# Patient Record
Sex: FEMALE | Race: WHITE | NOT HISPANIC OR LATINO | Employment: OTHER | URBAN - METROPOLITAN AREA
[De-identification: names, ages, dates, MRNs, and addresses within clinical notes are randomized per-mention and may not be internally consistent; named-entity substitution may affect disease eponyms.]

---

## 2018-07-13 ENCOUNTER — HOSPITAL ENCOUNTER (INPATIENT)
Facility: HOSPITAL | Age: 73
LOS: 2 days | Discharge: HOME | End: 2018-07-15
Attending: EMERGENCY MEDICINE | Admitting: INTERNAL MEDICINE

## 2018-07-13 ENCOUNTER — APPOINTMENT (OUTPATIENT)
Dept: GENERAL RADIOLOGY | Facility: HOSPITAL | Age: 73
End: 2018-07-13

## 2018-07-13 ENCOUNTER — APPOINTMENT (OUTPATIENT)
Dept: CARDIOLOGY | Facility: HOSPITAL | Age: 73
End: 2018-07-13
Attending: INTERNAL MEDICINE

## 2018-07-13 DIAGNOSIS — R11.10 VOMITING AND DIARRHEA: ICD-10-CM

## 2018-07-13 DIAGNOSIS — I44.2 THIRD DEGREE HEART BLOCK (HCC): Primary | ICD-10-CM

## 2018-07-13 DIAGNOSIS — R19.7 VOMITING AND DIARRHEA: ICD-10-CM

## 2018-07-13 PROBLEM — E03.9 HYPOTHYROID: Status: ACTIVE | Noted: 2018-07-13

## 2018-07-13 PROBLEM — I10 HTN (HYPERTENSION): Status: ACTIVE | Noted: 2018-07-13

## 2018-07-13 PROBLEM — J44.9 COPD (CHRONIC OBSTRUCTIVE PULMONARY DISEASE) (HCC): Status: ACTIVE | Noted: 2018-07-13

## 2018-07-13 PROBLEM — M06.9 RHEUMATOID ARTHRITIS (HCC): Status: ACTIVE | Noted: 2018-07-13

## 2018-07-13 LAB
ALBUMIN SERPL-MCNC: 4.2 G/DL (ref 3.5–5.2)
ALBUMIN/GLOB SERPL: 1.6 G/DL
ALP SERPL-CCNC: 71 U/L (ref 39–117)
ALT SERPL W P-5'-P-CCNC: 23 U/L (ref 1–33)
ANION GAP SERPL CALCULATED.3IONS-SCNC: 16.5 MMOL/L
APTT PPP: 28 SECONDS (ref 22.7–35.4)
ARTERIAL PATENCY WRIST A: POSITIVE
AST SERPL-CCNC: 19 U/L (ref 1–32)
ATMOSPHERIC PRESS: 755.9 MMHG
BASE EXCESS BLDA CALC-SCNC: -6 MMOL/L (ref 0–2)
BASOPHILS # BLD AUTO: 0.02 10*3/MM3 (ref 0–0.2)
BASOPHILS NFR BLD AUTO: 0.4 % (ref 0–1.5)
BDY SITE: ABNORMAL
BH CV ECHO MEAS - ACS: 2.2 CM
BH CV ECHO MEAS - AO MEAN PG (FULL): 3 MMHG
BH CV ECHO MEAS - AO MEAN PG: 5 MMHG
BH CV ECHO MEAS - AO V2 MAX: 152 CM/SEC
BH CV ECHO MEAS - AO V2 MEAN: 105 CM/SEC
BH CV ECHO MEAS - AO V2 VTI: 27.4 CM
BH CV ECHO MEAS - AVA(I,A): 2.5 CM^2
BH CV ECHO MEAS - AVA(I,D): 2.5 CM^2
BH CV ECHO MEAS - BSA(HAYCOCK): 1.9 M^2
BH CV ECHO MEAS - BSA: 1.8 M^2
BH CV ECHO MEAS - BZI_BMI: 32.3 KILOGRAMS/M^2
BH CV ECHO MEAS - BZI_METRIC_HEIGHT: 154.9 CM
BH CV ECHO MEAS - BZI_METRIC_WEIGHT: 77.6 KG
BH CV ECHO MEAS - CONTRAST EF (2CH): 55.1 ML/M^2
BH CV ECHO MEAS - CONTRAST EF 4CH: 53.5 ML/M^2
BH CV ECHO MEAS - EDV(CUBED): 91.1 ML
BH CV ECHO MEAS - EDV(MOD-SP2): 89 ML
BH CV ECHO MEAS - EDV(MOD-SP4): 99 ML
BH CV ECHO MEAS - EDV(TEICH): 92.4 ML
BH CV ECHO MEAS - EF(CUBED): 78.4 %
BH CV ECHO MEAS - EF(MOD-BP): 54 %
BH CV ECHO MEAS - EF(MOD-SP2): 55.1 %
BH CV ECHO MEAS - EF(MOD-SP4): 53.5 %
BH CV ECHO MEAS - EF(TEICH): 70.8 %
BH CV ECHO MEAS - ESV(CUBED): 19.7 ML
BH CV ECHO MEAS - ESV(MOD-SP2): 40 ML
BH CV ECHO MEAS - ESV(MOD-SP4): 46 ML
BH CV ECHO MEAS - ESV(TEICH): 27 ML
BH CV ECHO MEAS - FS: 40 %
BH CV ECHO MEAS - IVS/LVPW: 1
BH CV ECHO MEAS - IVSD: 0.8 CM
BH CV ECHO MEAS - LA DIMENSION: 5 CM
BH CV ECHO MEAS - LAT PEAK E' VEL: 7 CM/SEC
BH CV ECHO MEAS - LV DIASTOLIC VOL/BSA (35-75): 56 ML/M^2
BH CV ECHO MEAS - LV MASS(C)D: 113.6 GRAMS
BH CV ECHO MEAS - LV MASS(C)DI: 64.3 GRAMS/M^2
BH CV ECHO MEAS - LV MEAN PG: 2 MMHG
BH CV ECHO MEAS - LV SYSTOLIC VOL/BSA (12-30): 26 ML/M^2
BH CV ECHO MEAS - LV V1 MAX: 93 CM/SEC
BH CV ECHO MEAS - LV V1 MEAN: 61.1 CM/SEC
BH CV ECHO MEAS - LV V1 VTI: 19.4 CM
BH CV ECHO MEAS - LVIDD: 4.5 CM
BH CV ECHO MEAS - LVIDS: 2.7 CM
BH CV ECHO MEAS - LVLD AP2: 7.8 CM
BH CV ECHO MEAS - LVLD AP4: 8.4 CM
BH CV ECHO MEAS - LVLS AP2: 6.6 CM
BH CV ECHO MEAS - LVLS AP4: 7.5 CM
BH CV ECHO MEAS - LVOT AREA (M): 3.5 CM^2
BH CV ECHO MEAS - LVOT AREA: 3.5 CM^2
BH CV ECHO MEAS - LVOT DIAM: 2.1 CM
BH CV ECHO MEAS - LVPWD: 0.8 CM
BH CV ECHO MEAS - MED PEAK E' VEL: 6 CM/SEC
BH CV ECHO MEAS - MV A DUR: 0.14 SEC
BH CV ECHO MEAS - MV A MAX VEL: 105 CM/SEC
BH CV ECHO MEAS - MV DEC SLOPE: 434 CM/SEC^2
BH CV ECHO MEAS - MV DEC TIME: 0.38 SEC
BH CV ECHO MEAS - MV E MAX VEL: 81.9 CM/SEC
BH CV ECHO MEAS - MV E/A: 0.78
BH CV ECHO MEAS - MV MEAN PG: 2 MMHG
BH CV ECHO MEAS - MV P1/2T MAX VEL: 96.4 CM/SEC
BH CV ECHO MEAS - MV P1/2T: 65 MSEC
BH CV ECHO MEAS - MV V2 MEAN: 72.8 CM/SEC
BH CV ECHO MEAS - MV V2 VTI: 26.9 CM
BH CV ECHO MEAS - MVA P1/2T LCG: 2.3 CM^2
BH CV ECHO MEAS - MVA(P1/2T): 3.4 CM^2
BH CV ECHO MEAS - MVA(VTI): 2.5 CM^2
BH CV ECHO MEAS - PA ACC SLOPE: 14.8 CM/SEC^2
BH CV ECHO MEAS - PA ACC TIME: 0.09 SEC
BH CV ECHO MEAS - PA MAX PG: 4.4 MMHG
BH CV ECHO MEAS - PA PR(ACCEL): 37.6 MMHG
BH CV ECHO MEAS - PA V2 MAX: 105 CM/SEC
BH CV ECHO MEAS - PULM A REVS DUR: 0.12 SEC
BH CV ECHO MEAS - PULM A REVS VEL: 30.1 CM/SEC
BH CV ECHO MEAS - PULM DIAS VEL: 39 CM/SEC
BH CV ECHO MEAS - PULM S/D: 1.3
BH CV ECHO MEAS - PULM SYS VEL: 49.4 CM/SEC
BH CV ECHO MEAS - QP/QS: 0.74
BH CV ECHO MEAS - RV MEAN PG: 1 MMHG
BH CV ECHO MEAS - RV V1 MEAN: 46.7 CM/SEC
BH CV ECHO MEAS - RV V1 VTI: 14.4 CM
BH CV ECHO MEAS - RVOT AREA: 3.5 CM^2
BH CV ECHO MEAS - RVOT DIAM: 2.1 CM
BH CV ECHO MEAS - SI(CUBED): 40.4 ML/M^2
BH CV ECHO MEAS - SI(LVOT): 38 ML/M^2
BH CV ECHO MEAS - SI(MOD-SP2): 27.7 ML/M^2
BH CV ECHO MEAS - SI(MOD-SP4): 30 ML/M^2
BH CV ECHO MEAS - SI(TEICH): 37 ML/M^2
BH CV ECHO MEAS - SV(CUBED): 71.4 ML
BH CV ECHO MEAS - SV(LVOT): 67.2 ML
BH CV ECHO MEAS - SV(MOD-SP2): 49 ML
BH CV ECHO MEAS - SV(MOD-SP4): 53 ML
BH CV ECHO MEAS - SV(RVOT): 49.9 ML
BH CV ECHO MEAS - SV(TEICH): 65.4 ML
BH CV ECHO MEAS - TAPSE (>1.6): 1.9 CM2
BH CV ECHO MEASUREMENTS AVERAGE E/E' RATIO: 12.6
BH CV XLRA - RV BASE: 3.6 CM
BH CV XLRA - TDI S': 18 CM/SEC
BILIRUB SERPL-MCNC: 0.5 MG/DL (ref 0.1–1.2)
BUN BLD-MCNC: 23 MG/DL (ref 8–23)
BUN/CREAT SERPL: 21.1 (ref 7–25)
CALCIUM SPEC-SCNC: 9.4 MG/DL (ref 8.6–10.5)
CHLORIDE SERPL-SCNC: 101 MMOL/L (ref 98–107)
CO2 SERPL-SCNC: 18.5 MMOL/L (ref 22–29)
CREAT BLD-MCNC: 1.09 MG/DL (ref 0.57–1)
D-LACTATE SERPL-SCNC: 1.2 MMOL/L (ref 0.5–2)
DEPRECATED RDW RBC AUTO: 48.8 FL (ref 37–54)
EOSINOPHIL # BLD AUTO: 0.08 10*3/MM3 (ref 0–0.7)
EOSINOPHIL NFR BLD AUTO: 1.4 % (ref 0.3–6.2)
ERYTHROCYTE [DISTWIDTH] IN BLOOD BY AUTOMATED COUNT: 14 % (ref 11.7–13)
GAS FLOW AIRWAY: 3 LPM
GFR SERPL CREATININE-BSD FRML MDRD: 49 ML/MIN/1.73
GLOBULIN UR ELPH-MCNC: 2.6 GM/DL
GLUCOSE BLD-MCNC: 81 MG/DL (ref 65–99)
HCO3 BLDA-SCNC: 18.3 MMOL/L (ref 22–28)
HCT VFR BLD AUTO: 37.8 % (ref 35.6–45.5)
HGB BLD-MCNC: 12.4 G/DL (ref 11.9–15.5)
IMM GRANULOCYTES # BLD: 0.02 10*3/MM3 (ref 0–0.03)
IMM GRANULOCYTES NFR BLD: 0.4 % (ref 0–0.5)
INR PPP: 1.1 (ref 0.9–1.1)
LEFT ATRIUM VOLUME INDEX: 15 ML/M2
LEFT ATRIUM VOLUME: 25 CM3
LV EF 2D ECHO EST: 54 %
LYMPHOCYTES # BLD AUTO: 0.91 10*3/MM3 (ref 0.9–4.8)
LYMPHOCYTES NFR BLD AUTO: 16 % (ref 19.6–45.3)
MAGNESIUM SERPL-MCNC: 2.1 MG/DL (ref 1.6–2.4)
MAXIMAL PREDICTED HEART RATE: 149 BPM
MCH RBC QN AUTO: 31.5 PG (ref 26.9–32)
MCHC RBC AUTO-ENTMCNC: 32.8 G/DL (ref 32.4–36.3)
MCV RBC AUTO: 95.9 FL (ref 80.5–98.2)
MODALITY: ABNORMAL
MONOCYTES # BLD AUTO: 0.48 10*3/MM3 (ref 0.2–1.2)
MONOCYTES NFR BLD AUTO: 8.4 % (ref 5–12)
NEUTROPHILS # BLD AUTO: 4.19 10*3/MM3 (ref 1.9–8.1)
NEUTROPHILS NFR BLD AUTO: 73.4 % (ref 42.7–76)
NT-PROBNP SERPL-MCNC: 2077 PG/ML (ref 0–900)
PCO2 BLDA: 31.6 MM HG (ref 35–45)
PH BLDA: 7.37 PH UNITS (ref 7.35–7.45)
PHOSPHATE SERPL-MCNC: 3.5 MG/DL (ref 2.5–4.5)
PLATELET # BLD AUTO: 201 10*3/MM3 (ref 140–500)
PMV BLD AUTO: 10.4 FL (ref 6–12)
PO2 BLDA: 117.9 MM HG (ref 80–100)
POTASSIUM BLD-SCNC: 4.6 MMOL/L (ref 3.5–5.2)
PROT SERPL-MCNC: 6.8 G/DL (ref 6–8.5)
PROTHROMBIN TIME: 14 SECONDS (ref 11.7–14.2)
RBC # BLD AUTO: 3.94 10*6/MM3 (ref 3.9–5.2)
SAO2 % BLDCOA: 98.6 % (ref 92–99)
SET MECH RESP RATE: 20
SODIUM BLD-SCNC: 136 MMOL/L (ref 136–145)
STRESS TARGET HR: 127 BPM
T4 FREE SERPL-MCNC: 1.36 NG/DL (ref 0.93–1.7)
TROPONIN T SERPL-MCNC: <0.01 NG/ML (ref 0–0.03)
TSH SERPL DL<=0.05 MIU/L-ACNC: 6.53 MIU/ML (ref 0.27–4.2)
WBC NRBC COR # BLD: 5.7 10*3/MM3 (ref 4.5–10.7)

## 2018-07-13 PROCEDURE — 99285 EMERGENCY DEPT VISIT HI MDM: CPT

## 2018-07-13 PROCEDURE — 94664 DEMO&/EVAL PT USE INHALER: CPT

## 2018-07-13 PROCEDURE — 84439 ASSAY OF FREE THYROXINE: CPT | Performed by: EMERGENCY MEDICINE

## 2018-07-13 PROCEDURE — 94640 AIRWAY INHALATION TREATMENT: CPT

## 2018-07-13 PROCEDURE — 36600 WITHDRAWAL OF ARTERIAL BLOOD: CPT

## 2018-07-13 PROCEDURE — 99232 SBSQ HOSP IP/OBS MODERATE 35: CPT | Performed by: INTERNAL MEDICINE

## 2018-07-13 PROCEDURE — 93306 TTE W/DOPPLER COMPLETE: CPT | Performed by: INTERNAL MEDICINE

## 2018-07-13 PROCEDURE — 93010 ELECTROCARDIOGRAM REPORT: CPT | Performed by: INTERNAL MEDICINE

## 2018-07-13 PROCEDURE — 25010000002 MIDAZOLAM PER 1 MG: Performed by: INTERNAL MEDICINE

## 2018-07-13 PROCEDURE — 93005 ELECTROCARDIOGRAM TRACING: CPT | Performed by: EMERGENCY MEDICINE

## 2018-07-13 PROCEDURE — C1898 LEAD, PMKR, OTHER THAN TRANS: HCPCS | Performed by: INTERNAL MEDICINE

## 2018-07-13 PROCEDURE — 93306 TTE W/DOPPLER COMPLETE: CPT

## 2018-07-13 PROCEDURE — 33208 INSRT HEART PM ATRIAL & VENT: CPT | Performed by: INTERNAL MEDICINE

## 2018-07-13 PROCEDURE — 84443 ASSAY THYROID STIM HORMONE: CPT | Performed by: EMERGENCY MEDICINE

## 2018-07-13 PROCEDURE — 84100 ASSAY OF PHOSPHORUS: CPT | Performed by: EMERGENCY MEDICINE

## 2018-07-13 PROCEDURE — 85730 THROMBOPLASTIN TIME PARTIAL: CPT | Performed by: EMERGENCY MEDICINE

## 2018-07-13 PROCEDURE — 02HK3JZ INSERTION OF PACEMAKER LEAD INTO RIGHT VENTRICLE, PERCUTANEOUS APPROACH: ICD-10-PCS | Performed by: INTERNAL MEDICINE

## 2018-07-13 PROCEDURE — 85025 COMPLETE CBC W/AUTO DIFF WBC: CPT | Performed by: EMERGENCY MEDICINE

## 2018-07-13 PROCEDURE — 02H63JZ INSERTION OF PACEMAKER LEAD INTO RIGHT ATRIUM, PERCUTANEOUS APPROACH: ICD-10-PCS | Performed by: INTERNAL MEDICINE

## 2018-07-13 PROCEDURE — 80053 COMPREHEN METABOLIC PANEL: CPT | Performed by: EMERGENCY MEDICINE

## 2018-07-13 PROCEDURE — 83605 ASSAY OF LACTIC ACID: CPT | Performed by: EMERGENCY MEDICINE

## 2018-07-13 PROCEDURE — 25010000002 FENTANYL CITRATE (PF) 100 MCG/2ML SOLUTION: Performed by: INTERNAL MEDICINE

## 2018-07-13 PROCEDURE — 82803 BLOOD GASES ANY COMBINATION: CPT

## 2018-07-13 PROCEDURE — 25010000002 VANCOMYCIN PER 500 MG: Performed by: INTERNAL MEDICINE

## 2018-07-13 PROCEDURE — 85610 PROTHROMBIN TIME: CPT | Performed by: EMERGENCY MEDICINE

## 2018-07-13 PROCEDURE — C1785 PMKR, DUAL, RATE-RESP: HCPCS | Performed by: INTERNAL MEDICINE

## 2018-07-13 PROCEDURE — 83735 ASSAY OF MAGNESIUM: CPT | Performed by: EMERGENCY MEDICINE

## 2018-07-13 PROCEDURE — 83880 ASSAY OF NATRIURETIC PEPTIDE: CPT | Performed by: EMERGENCY MEDICINE

## 2018-07-13 PROCEDURE — 71045 X-RAY EXAM CHEST 1 VIEW: CPT

## 2018-07-13 PROCEDURE — 84484 ASSAY OF TROPONIN QUANT: CPT | Performed by: EMERGENCY MEDICINE

## 2018-07-13 PROCEDURE — 0JH606Z INSERTION OF PACEMAKER, DUAL CHAMBER INTO CHEST SUBCUTANEOUS TISSUE AND FASCIA, OPEN APPROACH: ICD-10-PCS | Performed by: INTERNAL MEDICINE

## 2018-07-13 DEVICE — IMPLANTABLE DEVICE: Type: IMPLANTABLE DEVICE | Status: FUNCTIONAL

## 2018-07-13 DEVICE — PACEMAKER
Type: IMPLANTABLE DEVICE | Status: FUNCTIONAL
Brand: ACCOLADE™ MRI DR

## 2018-07-13 DEVICE — PACE/SENSE LEAD
Type: IMPLANTABLE DEVICE | Status: FUNCTIONAL
Brand: INGEVITY™ MRI

## 2018-07-13 RX ORDER — LEVOTHYROXINE SODIUM 0.07 MG/1
75 TABLET ORAL
Status: DISCONTINUED | OUTPATIENT
Start: 2018-07-14 | End: 2018-07-15 | Stop reason: HOSPADM

## 2018-07-13 RX ORDER — SODIUM CHLORIDE 9 MG/ML
50 INJECTION, SOLUTION INTRAVENOUS CONTINUOUS
Status: DISCONTINUED | OUTPATIENT
Start: 2018-07-13 | End: 2018-07-14

## 2018-07-13 RX ORDER — LISINOPRIL 20 MG/1
20 TABLET ORAL DAILY
Status: DISCONTINUED | OUTPATIENT
Start: 2018-07-14 | End: 2018-07-15 | Stop reason: HOSPADM

## 2018-07-13 RX ORDER — ONDANSETRON 4 MG/1
4 TABLET, ORALLY DISINTEGRATING ORAL EVERY 6 HOURS PRN
Status: DISCONTINUED | OUTPATIENT
Start: 2018-07-13 | End: 2018-07-15 | Stop reason: HOSPADM

## 2018-07-13 RX ORDER — LEVOTHYROXINE SODIUM 88 UG/1
88 TABLET ORAL DAILY
COMMUNITY
End: 2019-06-09 | Stop reason: HOSPADM

## 2018-07-13 RX ORDER — FOLIC ACID 1 MG/1
1 TABLET ORAL 2 TIMES DAILY
Status: DISCONTINUED | OUTPATIENT
Start: 2018-07-13 | End: 2018-07-15 | Stop reason: HOSPADM

## 2018-07-13 RX ORDER — BENAZEPRIL HYDROCHLORIDE 20 MG/1
20 TABLET ORAL DAILY
COMMUNITY
End: 2019-08-22

## 2018-07-13 RX ORDER — ONDANSETRON 4 MG/1
4 TABLET, FILM COATED ORAL EVERY 6 HOURS PRN
Status: DISCONTINUED | OUTPATIENT
Start: 2018-07-13 | End: 2018-07-15 | Stop reason: HOSPADM

## 2018-07-13 RX ORDER — HYDROCODONE BITARTRATE AND ACETAMINOPHEN 7.5; 325 MG/1; MG/1
1 TABLET ORAL 2 TIMES DAILY PRN
Status: DISCONTINUED | OUTPATIENT
Start: 2018-07-13 | End: 2018-07-15 | Stop reason: HOSPADM

## 2018-07-13 RX ORDER — PANTOPRAZOLE SODIUM 40 MG/1
40 TABLET, DELAYED RELEASE ORAL EVERY MORNING
Status: DISCONTINUED | OUTPATIENT
Start: 2018-07-14 | End: 2018-07-15 | Stop reason: HOSPADM

## 2018-07-13 RX ORDER — CHOLECALCIFEROL (VITAMIN D3) 125 MCG
500 CAPSULE ORAL DAILY
Status: DISCONTINUED | OUTPATIENT
Start: 2018-07-13 | End: 2018-07-15 | Stop reason: HOSPADM

## 2018-07-13 RX ORDER — PHENOL 1.4 %
900 AEROSOL, SPRAY (ML) MUCOUS MEMBRANE EVERY EVENING
COMMUNITY

## 2018-07-13 RX ORDER — ASPIRIN 81 MG/1
81 TABLET ORAL EVERY EVENING
COMMUNITY

## 2018-07-13 RX ORDER — MELATONIN
1000 DAILY
Status: DISCONTINUED | OUTPATIENT
Start: 2018-07-13 | End: 2018-07-15 | Stop reason: HOSPADM

## 2018-07-13 RX ORDER — VANCOMYCIN HYDROCHLORIDE 1 G/200ML
1000 INJECTION, SOLUTION INTRAVENOUS ONCE
Status: COMPLETED | OUTPATIENT
Start: 2018-07-13 | End: 2018-07-13

## 2018-07-13 RX ORDER — SODIUM CHLORIDE 0.9 % (FLUSH) 0.9 %
10 SYRINGE (ML) INJECTION AS NEEDED
Status: DISCONTINUED | OUTPATIENT
Start: 2018-07-13 | End: 2018-07-15 | Stop reason: HOSPADM

## 2018-07-13 RX ORDER — ASPIRIN 81 MG/1
81 TABLET ORAL EVERY EVENING
Status: DISCONTINUED | OUTPATIENT
Start: 2018-07-13 | End: 2018-07-15 | Stop reason: HOSPADM

## 2018-07-13 RX ORDER — LIDOCAINE HYDROCHLORIDE 10 MG/ML
INJECTION, SOLUTION INFILTRATION; PERINEURAL AS NEEDED
Status: DISCONTINUED | OUTPATIENT
Start: 2018-07-13 | End: 2018-07-13 | Stop reason: HOSPADM

## 2018-07-13 RX ORDER — SODIUM CHLORIDE 0.9 % (FLUSH) 0.9 %
1-10 SYRINGE (ML) INJECTION AS NEEDED
Status: DISCONTINUED | OUTPATIENT
Start: 2018-07-13 | End: 2018-07-15 | Stop reason: HOSPADM

## 2018-07-13 RX ORDER — HYDROCODONE BITARTRATE AND ACETAMINOPHEN 7.5; 325 MG/1; MG/1
1 TABLET ORAL 2 TIMES DAILY PRN
COMMUNITY

## 2018-07-13 RX ORDER — NITROGLYCERIN 0.4 MG/1
0.4 TABLET SUBLINGUAL
Status: DISCONTINUED | OUTPATIENT
Start: 2018-07-13 | End: 2018-07-15 | Stop reason: HOSPADM

## 2018-07-13 RX ORDER — FOLIC ACID 1 MG/1
1 TABLET ORAL 2 TIMES DAILY
COMMUNITY
End: 2020-08-26

## 2018-07-13 RX ORDER — ALBUTEROL SULFATE 90 UG/1
2 AEROSOL, METERED RESPIRATORY (INHALATION) EVERY 4 HOURS PRN
COMMUNITY

## 2018-07-13 RX ORDER — ALBUTEROL SULFATE 2.5 MG/3ML
2.5 SOLUTION RESPIRATORY (INHALATION) EVERY 6 HOURS PRN
Status: DISCONTINUED | OUTPATIENT
Start: 2018-07-13 | End: 2018-07-15 | Stop reason: HOSPADM

## 2018-07-13 RX ORDER — LISINOPRIL 20 MG/1
20 TABLET ORAL DAILY
Status: DISCONTINUED | OUTPATIENT
Start: 2018-07-13 | End: 2018-07-13 | Stop reason: SDUPTHER

## 2018-07-13 RX ORDER — ONDANSETRON 2 MG/ML
4 INJECTION INTRAMUSCULAR; INTRAVENOUS EVERY 6 HOURS PRN
Status: DISCONTINUED | OUTPATIENT
Start: 2018-07-13 | End: 2018-07-15 | Stop reason: HOSPADM

## 2018-07-13 RX ORDER — SERTRALINE HYDROCHLORIDE 100 MG/1
100 TABLET, FILM COATED ORAL DAILY
Status: DISCONTINUED | OUTPATIENT
Start: 2018-07-14 | End: 2018-07-15 | Stop reason: HOSPADM

## 2018-07-13 RX ORDER — UREA 10 %
1250 LOTION (ML) TOPICAL DAILY
Status: DISCONTINUED | OUTPATIENT
Start: 2018-07-13 | End: 2018-07-13 | Stop reason: CLARIF

## 2018-07-13 RX ORDER — CALCIUM CARBONATE 500(1250)
500 TABLET ORAL DAILY
Status: DISCONTINUED | OUTPATIENT
Start: 2018-07-13 | End: 2018-07-15 | Stop reason: HOSPADM

## 2018-07-13 RX ORDER — CHOLECALCIFEROL (VITAMIN D3) 125 MCG
500 CAPSULE ORAL DAILY
COMMUNITY

## 2018-07-13 RX ORDER — OXYCODONE HYDROCHLORIDE AND ACETAMINOPHEN 5; 325 MG/1; MG/1
1 TABLET ORAL EVERY 4 HOURS PRN
Status: DISCONTINUED | OUTPATIENT
Start: 2018-07-13 | End: 2018-07-15 | Stop reason: HOSPADM

## 2018-07-13 RX ORDER — BUDESONIDE AND FORMOTEROL FUMARATE DIHYDRATE 80; 4.5 UG/1; UG/1
2 AEROSOL RESPIRATORY (INHALATION)
Status: DISCONTINUED | OUTPATIENT
Start: 2018-07-13 | End: 2018-07-15 | Stop reason: HOSPADM

## 2018-07-13 RX ORDER — RABEPRAZOLE SODIUM 20 MG/1
20 TABLET, DELAYED RELEASE ORAL DAILY
COMMUNITY
End: 2018-08-16 | Stop reason: ALTCHOICE

## 2018-07-13 RX ORDER — ACETAMINOPHEN 325 MG/1
650 TABLET ORAL EVERY 4 HOURS PRN
Status: DISCONTINUED | OUTPATIENT
Start: 2018-07-13 | End: 2018-07-15 | Stop reason: HOSPADM

## 2018-07-13 RX ORDER — HYDROXYCHLOROQUINE SULFATE 200 MG/1
200 TABLET, FILM COATED ORAL EVERY EVENING
COMMUNITY

## 2018-07-13 RX ORDER — HYDROXYCHLOROQUINE SULFATE 200 MG/1
200 TABLET, FILM COATED ORAL EVERY EVENING
Status: DISCONTINUED | OUTPATIENT
Start: 2018-07-13 | End: 2018-07-15 | Stop reason: HOSPADM

## 2018-07-13 RX ORDER — OMEGA-3S/DHA/EPA/FISH OIL/D3 300MG-1000
1200 CAPSULE ORAL EVERY EVENING
COMMUNITY
End: 2018-08-16

## 2018-07-13 RX ORDER — FENTANYL CITRATE 50 UG/ML
INJECTION, SOLUTION INTRAMUSCULAR; INTRAVENOUS AS NEEDED
Status: DISCONTINUED | OUTPATIENT
Start: 2018-07-13 | End: 2018-07-13 | Stop reason: HOSPADM

## 2018-07-13 RX ORDER — METOPROLOL SUCCINATE 25 MG/1
25 TABLET, EXTENDED RELEASE ORAL DAILY
Status: DISCONTINUED | OUTPATIENT
Start: 2018-07-13 | End: 2018-07-15 | Stop reason: HOSPADM

## 2018-07-13 RX ORDER — SERTRALINE HYDROCHLORIDE 100 MG/1
100 TABLET, FILM COATED ORAL DAILY
COMMUNITY

## 2018-07-13 RX ORDER — MELATONIN
1000 DAILY
COMMUNITY
End: 2018-08-16

## 2018-07-13 RX ORDER — MIDAZOLAM HYDROCHLORIDE 1 MG/ML
INJECTION INTRAMUSCULAR; INTRAVENOUS AS NEEDED
Status: DISCONTINUED | OUTPATIENT
Start: 2018-07-13 | End: 2018-07-13 | Stop reason: HOSPADM

## 2018-07-13 RX ORDER — LISINOPRIL 5 MG/1
5 TABLET ORAL DAILY
Status: DISCONTINUED | OUTPATIENT
Start: 2018-07-13 | End: 2018-07-13

## 2018-07-13 RX ORDER — LISINOPRIL 10 MG/1
10 TABLET ORAL
Status: DISCONTINUED | OUTPATIENT
Start: 2018-07-13 | End: 2018-07-13

## 2018-07-13 RX ADMIN — FOLIC ACID 1 MG: 1 TABLET ORAL at 20:47

## 2018-07-13 RX ADMIN — HYDROCODONE BITARTRATE AND ACETAMINOPHEN 1 TABLET: 7.5; 325 TABLET ORAL at 20:52

## 2018-07-13 RX ADMIN — ASPIRIN 81 MG: 81 TABLET ORAL at 20:47

## 2018-07-13 RX ADMIN — LISINOPRIL 20 MG: 20 TABLET ORAL at 21:33

## 2018-07-13 RX ADMIN — METOPROLOL SUCCINATE 25 MG: 25 TABLET, FILM COATED, EXTENDED RELEASE ORAL at 20:47

## 2018-07-13 RX ADMIN — SODIUM CHLORIDE 50 ML/HR: 9 INJECTION, SOLUTION INTRAVENOUS at 18:47

## 2018-07-13 RX ADMIN — Medication 500 MCG: at 20:47

## 2018-07-13 RX ADMIN — CALCIUM 500 MG: 500 TABLET ORAL at 20:47

## 2018-07-13 RX ADMIN — SODIUM CHLORIDE 1000 ML: 9 INJECTION, SOLUTION INTRAVENOUS at 12:24

## 2018-07-13 RX ADMIN — VANCOMYCIN HYDROCHLORIDE 1000 MG: 1 INJECTION, SOLUTION INTRAVENOUS at 13:32

## 2018-07-13 RX ADMIN — BUDESONIDE AND FORMOTEROL FUMARATE DIHYDRATE 2 PUFF: 80; 4.5 AEROSOL RESPIRATORY (INHALATION) at 20:42

## 2018-07-13 RX ADMIN — HYDROXYCHLOROQUINE SULFATE 200 MG: 200 TABLET, FILM COATED ORAL at 20:46

## 2018-07-13 RX ADMIN — VITAMIN D, TAB 1000IU (100/BT) 1000 UNITS: 25 TAB at 20:47

## 2018-07-13 NOTE — ED TRIAGE NOTES
Pt was called for nausea/vomiting/diarrhea for 3 days. Purple Discoloration noted by family starting today. Placed on heart monitor by EMS. Noted to have HR of 32 on heart monitor. /80. SpO2 WNL. Placed on 3L for work of breathing.  18g IV placed in left AC.

## 2018-07-13 NOTE — PLAN OF CARE
Problem: Patient Care Overview  Goal: Plan of Care Review   07/13/18 2418   Coping/Psychosocial   Plan of Care Reviewed With patient   Plan of Care Review   Progress improving   OTHER   Outcome Summary Left chest pacemaker dressing dry and intact. No c/o pain. REsting well.     Goal: Individualization and Mutuality  Outcome: Ongoing (interventions implemented as appropriate)    Goal: Discharge Needs Assessment  Outcome: Ongoing (interventions implemented as appropriate)      Problem: Cardiac Rhythm Management Device (Adult)  Goal: Signs and Symptoms of Listed Potential Problems Will be Absent, Minimized or Managed (Cardiac Rhythm Management Device)  Outcome: Ongoing (interventions implemented as appropriate)

## 2018-07-13 NOTE — CONSULTS
Patient Name: Barbara Segovia  Age/Sex: 72 y.o. female  : 1945  MRN: [id number]    Date of Admission: 2018  Date of Encounter Visit: 18  Encounter Provider: Duke Castillo MD  Place of Service: [facility] CARDIOLOGY      Referring Provider: No ref. provider found  Patient Care Team:  Ramiro Briones MD as PCP - General (Internal Medicine)    Subjective:     Consulted for: Complete Heart block    Chief Complaint: Weakness, fatigue and dizziness         History of Present Illness:  Barbara Segovia is a 72 y.o. female with a history of hypertension, rheumatoid arthritis, and hypothyroidism.  She presented to her PCP in [place] this morning with dizziness and generalized weakness.  The patient states she had nausea and diarrhea for almost 2 weeks.  An EKG was done in the office which revealed complete heart block with HR in the 30's.  EMS was called and she was brought to the ER.  The patient has no prior cardiac history and does not see a cardiologist.  She takes benazapril for her hypertension.    On arrival her B/P was elevated at 200/100.  EKG confirmed complete heart block.  She denies any chest pain. She has mild SOA.  Her labs were as follows:  BUN/Creat 23/1.09, K 4.6, BNP 2,077, troponin negative, Mg 2.1, TSH 6.50, WBC 5.7, Hgb 12.4.  We have been asked to see patietn urgently for consideration for pacemaker.          Previous testing:     No previous cardiac testing      Past Medical History:  Hypertension, rheumatoid arthritis, hypothyroidism  Multiple orthopedic surgeries  Home Medications:   See admission record    Allergies:  Allergies   Allergen Reactions   • Penicillins    • Sulfur    • Latex Rash       Past Social History:  Social History     Social History   • Marital status:      Spouse name: N/A   • Number of children: N/A   • Years of education: N/A     Occupational History   • Not on file.     Social History Main Topics    • Smoking status: Unknown If Ever Smoked   • Smokeless tobacco: Not on file      Comment: Patient reported that she does not smoke   • Alcohol use Not on file   • Drug use: Unknown   • Sexual activity: Not on file     Other Topics Concern   • Not on file     Social History Narrative   • No narrative on file        Past Family History:  No family history on file.      Review of Systems:  All systems reviewed. Pertinent positives identified in HPI. All other systems are negative.   REVIEW OF SYSTEMS:   CONSTITUTIONAL: Weakness fatigue and dizziness HEENT: Eyes: No visual loss, blurred vision, double vision or yellow sclerae. Ears, Nose, Throat: No hearing loss, sneezing, congestion, runny nose or sore throat.   SKIN: No rash or itching.     RESPIRATORY: No shortness of breath, hemoptysis, cough or sputum.   GASTROINTESTINAL: Nausea vomiting diarrhea   GENITOURINARY: No burning on urination, hematuria or increased frequency.  NEUROLOGICAL: No headache, dizziness, syncope, paralysis, ataxia, numbness or tingling in the extremities. No change in bowel or bladder control.   MUSCULOSKELETAL: No muscle, back pain, joint pain or stiffness.   HEMATOLOGIC: No anemia, bleeding or bruising.   LYMPHATICS: No enlarged nodes. No history of splenectomy.   PSYCHIATRIC: No history of depression, anxiety, hallucinations.   ENDOCRINOLOGIC: No reports of sweating, cold or heat intolerance. No polyuria or polydipsia.       Objective:     Objective:  Temp:  [98.5 °F (36.9 °C)] 98.5 °F (36.9 °C)  Heart Rate:  [30-33] 30  Resp:  [16-24] 18  BP: ()/() 115/70    Intake/Output Summary (Last 24 hours) at 07/13/18 1502  Last data filed at 07/13/18 1445   Gross per 24 hour   Intake             1000 ml   Output                0 ml   Net             1000 ml     Body mass index is 32.33 kg/m².  1    07/13/18  1214   Weight: 77.6 kg (171 lb 1.6 oz)           Physical Exam:   General Appearance Well developed, cooperative and well  nourished and no acute distress   Head Normocephalic, without abnormality, atraumatic   Ears Ears appear intact with no abnormalities noted   Throat No oral lesions, no thrush, oral mucosa moist   Neck No adenopathy, supple, trachea midline, no thyromegaly, no carotid bruit, no JVD   Back No skin lesions, erythema or scars, no tenderness to percussion or palptaion,range of motion is normal   Lungs Clear to auscultation,respirations regular, even and unlabored   Heart Bradycardic normal S1 and S2, no murmur, no gallop, no rub, no click   Chest wall No abnormalities observed   Abdomen Normal bowel sounds, no masses, no hepatomegaly,    Extremities Marked joint deformity    Pulses Pulses palpable and equal bilaterally. Normal radial, carotid, femoral, dorsalis pedis and posterior tibial pulses bilaterally. Normal abdominal aorta   Skin No bleeding, bruising or rash   Psyhiatric Alert and oriented x 3, normal mood and affect       Lab Review:       Results from last 7 days  Lab Units 07/13/18  1219   SODIUM mmol/L 136   POTASSIUM mmol/L 4.6   CHLORIDE mmol/L 101   CO2 mmol/L 18.5*   BUN mg/dL 23   CREATININE mg/dL 1.09*   GLUCOSE mg/dL 81   CALCIUM mg/dL 9.4         Results from last 7 days  Lab Units 07/13/18  1219   TROPONIN T ng/mL <0.010       Results from last 7 days  Lab Units 07/13/18  1219   WBC 10*3/mm3 5.70   HEMOGLOBIN g/dL 12.4   HEMATOCRIT % 37.8   PLATELETS 10*3/mm3 201       Results from last 7 days  Lab Units 07/13/18  1219   INR  1.10   APTT seconds 28.0           Results from last 7 days  Lab Units 07/13/18  1219   MAGNESIUM mg/dL 2.1           Results from last 7 days  Lab Units 07/13/18  1219   PROBNP pg/mL 2,077.0*           Results from last 7 days  Lab Units 07/13/18  1219   TSH mIU/mL 6.530*       Imaging:    Imaging Results (most recent)     Procedure Component Value Units Date/Time    XR Chest 1 View [[id number]] Collected:  07/13/18 1301     Updated:  07/13/18 1304    Narrative:       PORTABLE  CHEST X-RAY     HISTORY: Weakness vomiting and diarrhea for 2 weeks.     Portable chest x-ray is provided. Compared with chest x-ray September 8, 2009     FINDINGS: The cardiomediastinal silhouette is normal. The lungs are  clear. The costophrenic sulci are dry and the bones appear normal. There  is no pneumothorax.       Impression:       Negative.     This report was finalized on 7/13/2018 1:01 PM by Dr. Robi Askew M.D.                CXR  IMPRESSION:  Negative.      EKG:           BASELINE:       I personally viewed and interpreted the patient's EKG/Telemetry data.    Assessment:   Assessment/Plan       Active Problems:    Third degree heart block (CMS/HCC)  2.  Hypertension: On medical therapy.  Adjustments after pacemaker  3.  Hypothyroidism with slight elevation in TSH.  The elevation in TSH is not consistent with complete heart block  4.  Rheumatoid arthritis  5.  Elevated proBNP: We'll check echo.  I suspect this is from her complete heart block and low event ventricular response  6.  Nausea and vomiting as well as persistent diarrhea: To be evaluated        Thank you for allowing me to participate in the care of Barbara RANGEL Segovia. Feel free to contact me directly with any further questions or concerns.    Duke Castillo MD  [facility]  07/13/18  3:02 PM

## 2018-07-13 NOTE — ED PROVIDER NOTES
EMERGENCY DEPARTMENT ENCOUNTER    CHIEF COMPLAINT  Chief Complaint: Abnormal EKG  History given by: Pt and EMS  History limited by: none stated  Room Number: [facility]  PMD: Ramiro Briones MD      HPI:  Pt is a 72 y.o. female who presents complaining of abnormal EKG from earlier today when she was at her PCP. Pt states she has had intermittent N/V/D for 1.5 weeks, went to PCP today who called EMS after an abnml EKG. She also complains of generalized weakness. Per EMS, her HR was 32,BP was 180/100, and her O2 sats were 95%-96% and she was put on 2L PTA. She states no prior heart history.    Duration:  Since earlier today  Timing: constant  Location: Heart  Quality: Abnml EKG  Intensity/Severity: severe  Progression: none stated  Associated Symptoms: N/V/D and generalized weakness  Aggravating Factors: none stated  Alleviating Factors: none stated  Previous Episodes: none stated  Treatment before arrival: none stated    PAST MEDICAL HISTORY  Active Ambulatory Problems     Diagnosis Date Noted   • No Active Ambulatory Problems     Resolved Ambulatory Problems     Diagnosis Date Noted   • No Resolved Ambulatory Problems     No Additional Past Medical History       PAST SURGICAL HISTORY  No past surgical history on file.    FAMILY HISTORY  No family history on file.    SOCIAL HISTORY  Social History     Social History   • Marital status:      Spouse name: N/A   • Number of children: N/A   • Years of education: N/A     Occupational History   • Not on file.     Social History Main Topics   • Smoking status: Unknown If Ever Smoked   • Smokeless tobacco: Not on file      Comment: Patient reported that she does not smoke   • Alcohol use Not on file   • Drug use: Unknown   • Sexual activity: Not on file     Other Topics Concern   • Not on file     Social History Narrative   • No narrative on file       ALLERGIES  Penicillins; Sulfur; and Latex    REVIEW OF SYSTEMS  Review of Systems   Constitutional: Negative for  fever.   HENT: Negative for sore throat.    Eyes: Negative.    Respiratory: Negative for cough and shortness of breath.    Cardiovascular: Negative for chest pain.        Abnml EKG   Gastrointestinal: Positive for diarrhea, nausea and vomiting. Negative for abdominal pain.   Genitourinary: Negative for dysuria.   Musculoskeletal: Negative for neck pain.   Skin: Negative for rash.   Allergic/Immunologic: Negative.    Neurological: Positive for weakness (Generalized). Negative for numbness and headaches.   Hematological: Negative.    Psychiatric/Behavioral: Negative.    All other systems reviewed and are negative.      PHYSICAL EXAM  ED Triage Vitals   Temp Pulse Resp BP SpO2   -- -- -- -- --      Temp src Heart Rate Source Patient Position BP Location FiO2 (%)   -- -- -- -- --       Physical Exam   Constitutional: She is oriented to person, place, and time. No distress.   HENT:   Head: Normocephalic and atraumatic.   Eyes: EOM are normal. Pupils are equal, round, and reactive to light.   Neck: Normal range of motion. Neck supple.   Cardiovascular: Regular rhythm and normal heart sounds.  Bradycardia present.    Pulmonary/Chest: Effort normal and breath sounds normal. No respiratory distress.   Abdominal: Soft. There is no tenderness. There is no rebound and no guarding.   Musculoskeletal: Normal range of motion. She exhibits no edema.   Mild edema of lower extremities with good palpable pulses   Neurological: She is alert and oriented to person, place, and time. She has normal sensation and normal strength.   Skin: Skin is warm and dry. No rash noted. There is pallor.   Pale bluish skin which pt states is chronic for her secondary to gold shots   Psychiatric: Mood and affect normal.   Nursing note and vitals reviewed.      LAB RESULTS  Lab Results (last 24 hours)     Procedure Component Value Units Date/Time    CBC & Differential [[id number]] Collected:  07/13/18 1219    Specimen:  Blood Updated:  07/13/18 1237     Narrative:       The following orders were created for panel order CBC & Differential.  Procedure                               Abnormality         Status                     ---------                               -----------         ------                     CBC Auto Differential[[id number]]        Abnormal            Final result                 Please view results for these tests on the individual orders.    Comprehensive Metabolic Panel [[id number]]  (Abnormal) Collected:  07/13/18 1219    Specimen:  Blood Updated:  07/13/18 1259     Glucose 81 mg/dL      BUN 23 mg/dL      Creatinine 1.09 (H) mg/dL      Sodium 136 mmol/L      Potassium 4.6 mmol/L      Chloride 101 mmol/L      CO2 18.5 (L) mmol/L      Calcium 9.4 mg/dL      Total Protein 6.8 g/dL      Albumin 4.20 g/dL      ALT (SGPT) 23 U/L      AST (SGOT) 19 U/L      Alkaline Phosphatase 71 U/L      Total Bilirubin 0.5 mg/dL      eGFR Non African Amer 49 (L) mL/min/1.73      Globulin 2.6 gm/dL      A/G Ratio 1.6 g/dL      BUN/Creatinine Ratio 21.1     Anion Gap 16.5 mmol/L     Narrative:       The MDRD GFR formula is only valid for adults with stable renal function between ages 18 and 70.    Protime-INR [[id number]]  (Normal) Collected:  07/13/18 1219    Specimen:  Blood Updated:  07/13/18 1246     Protime 14.0 Seconds      INR 1.10    aPTT [[id number]]  (Normal) Collected:  07/13/18 1219    Specimen:  Blood Updated:  07/13/18 1246     PTT 28.0 seconds     BNP [[id number]]  (Abnormal) Collected:  07/13/18 1219    Specimen:  Blood Updated:  07/13/18 1302     proBNP 2,077.0 (H) pg/mL     Narrative:       Among patients with dyspnea, NT-proBNP is highly sensitive for the detection of acute congestive heart failure. In addition NT-proBNP of <300 pg/ml effectively rules out acute congestive heart failure with 99% negative predictive value.    Troponin [[id number]]  (Normal) Collected:  07/13/18 1219    Specimen:  Blood Updated:  07/13/18 1302     Troponin T <0.010 ng/mL      Narrative:       Troponin T Reference Ranges:  Less than 0.03 ng/mL:    Negative for AMI  0.03 to 0.09 ng/mL:      Indeterminant for AMI  Greater than 0.09 ng/mL: Positive for AMI    Lactic Acid, Plasma [[id number]]  (Normal) Collected:  07/13/18 1219    Specimen:  Blood Updated:  07/13/18 1243     Lactate 1.2 mmol/L     Magnesium [[id number]]  (Normal) Collected:  07/13/18 1219    Specimen:  Blood Updated:  07/13/18 1259     Magnesium 2.1 mg/dL     TSH [[id number]]  (Abnormal) Collected:  07/13/18 1219    Specimen:  Blood Updated:  07/13/18 1307     TSH 6.530 (H) mIU/mL     T4, Free [[id number]]  (Normal) Collected:  07/13/18 1219    Specimen:  Blood Updated:  07/13/18 1307     Free T4 1.36 ng/dL     Phosphorus [[id number]]  (Normal) Collected:  07/13/18 1219    Specimen:  Blood Updated:  07/13/18 1259     Phosphorus 3.5 mg/dL     CBC Auto Differential [[id number]]  (Abnormal) Collected:  07/13/18 1219    Specimen:  Blood Updated:  07/13/18 1239     WBC 5.70 10*3/mm3      RBC 3.94 10*6/mm3      Hemoglobin 12.4 g/dL      Hematocrit 37.8 %      MCV 95.9 fL      MCH 31.5 pg      MCHC 32.8 g/dL      RDW 14.0 (H) %      RDW-SD 48.8 fl      MPV 10.4 fL      Platelets 201 10*3/mm3      Neutrophil % 73.4 %      Lymphocyte % 16.0 (L) %      Monocyte % 8.4 %      Eosinophil % 1.4 %      Basophil % 0.4 %      Immature Grans % 0.4 %      Neutrophils, Absolute 4.19 10*3/mm3      Lymphocytes, Absolute 0.91 10*3/mm3      Monocytes, Absolute 0.48 10*3/mm3      Eosinophils, Absolute 0.08 10*3/mm3      Basophils, Absolute 0.02 10*3/mm3      Immature Grans, Absolute 0.02 10*3/mm3     Blood Gas, Arterial [[id number]]  (Abnormal) Collected:  07/13/18 1245    Specimen:  Arterial Blood Updated:  07/13/18 1248     Site Arterial: right radial     Jorge's Test Positive     pH, Arterial 7.372 pH units      pCO2, Arterial 31.6 (L) mm Hg      pO2, Arterial 117.9 (H) mm Hg      HCO3, Arterial 18.3 (L) mmol/L      Base Excess, Arterial -6.0 (L)  mmol/L      O2 Saturation Calculated 98.6 %      Barometric Pressure for Blood Gas 755.9 mmHg      Modality Cannula     Flow Rate 3 lpm      Set Mech Resp Rate 20    Narrative:       Meter: [id number] : [id number] Pankaj Barker Instructor          I ordered the above labs and reviewed the results    RADIOLOGY  XR Chest 1 View   Final Result   Negative.       This report was finalized on 7/13/2018 1:01 PM by Dr. Robi Askew M.D.               I ordered the above noted radiological studies. Interpreted by radiologist. Reviewed by me in PACS.       PROCEDURES  Critical Care  Performed by: SYBIL URIBE  Authorized by: SYBIL URIBE     Critical care provider statement:     Critical care time (minutes):  35    Critical care time was exclusive of:  Separately billable procedures and treating other patients    Critical care was necessary to treat or prevent imminent or life-threatening deterioration of the following conditions:  Cardiac failure    Critical care was time spent personally by me on the following activities:  Development of treatment plan with patient or surrogate, discussions with consultants, evaluation of patient's response to treatment, examination of patient, obtaining history from patient or surrogate, ordering and performing treatments and interventions, ordering and review of laboratory studies, ordering and review of radiographic studies, pulse oximetry, re-evaluation of patient's condition and review of old charts    I assumed direction of critical care for this patient from another provider in my specialty: no          EKG           EKG time: 1210  Rhythm/Rate: 3rd degree heart block w/ rate of 30  QRS, axis: Left axis deviation, Poor R wave progression anteriorly   ST and T waves: nonspecifci ST wave changes     Interpreted Contemporaneously by me, independently viewed  No prior      PROGRESS AND CONSULTS  12:25 PM  Discussed pt's case with Dr Castillo ([facility]), who will see pt in  consult    1:25 PM  Discussed pt's case with Dr Castillo's Nurse ([facility]), who says pt is going to cath lab to have a pacemaker implanted and she needs to be admitted to medicine.    2:04 PM  Discussed pt's case with Dr Lee ([facility]), who will admit pt to tele bed.      MEDICAL DECISION MAKING  Results were reviewed/discussed with the patient and they were also made aware of online access. Pt also made aware that some labs, such as cultures, will not be resulted during ER visit and follow up with PMD is necessary.     MDM  Number of Diagnoses or Management Options  Third degree heart block (CMS/HCC):   Vomiting and diarrhea:      Amount and/or Complexity of Data Reviewed  Clinical lab tests: ordered and reviewed (Troponin is <0.010  TSH 6.530 (H)   pCO2, Arterial 31.6 (L)   pO2, Arterial 117.9 (H)   HCO3, Arterial 18.3 (L)   Base Excess, Arterial -6.0 (L)   )  Tests in the radiology section of CPT®: ordered and reviewed (CXR-NAD)  Tests in the medicine section of CPT®: ordered and reviewed (See Procedure Note)  Decide to obtain previous medical records or to obtain history from someone other than the patient: yes  Obtain history from someone other than the patient: yes  Review and summarize past medical records: yes  Discuss the patient with other providers: yes  Independent visualization of images, tracings, or specimens: yes    Critical Care  Total time providing critical care: 30-74 minutes         DIAGNOSIS  Final diagnoses:   Third degree heart block (CMS/HCC)   Vomiting and diarrhea       DISPOSITION  ADMISSION    Discussed treatment plan and reason for admission with pt/family and admitting physician.  Pt/family voiced understanding of the plan for admission for further testing/treatment as needed.         Latest Documented Vital Signs:  As of 2:06 PM  BP- 165/82 HR- (!) 32 Temp- 98.5 °F (36.9 °C) O2 sat- 98%    --  Documentation assistance provided by erick Ghosh for .  Information recorded  by the scribe was done at my direction and has been verified and validated by me.          Tu Ghosh  07/13/18 9057       Anthony Alaniz MD  07/13/18 1543

## 2018-07-13 NOTE — H&P
HISTORY AND PHYSICAL   [facility]        Patient Identification:  Name: Barbara Segovia  Age: 72 y.o.  Sex: female  :  1945  MRN: [id number]                     Primary Care Physician: Ramiro Briones MD    Chief Complaint:  diarrhea    History of Present Illness:   Pleasant 72-year-old female that was sent to the emergency room from her primary care physician's office when she is noted to be in third degree AV block.  On questioning her she notes she has been experiencing orthopnea for about 3 months now.  She states she gets short of breath lying flat.  She would sleep with several pillows propping her up.  She states that not infrequently one the pillows would fall out and she'll wake up short of air again.  About 3 weeks ago she developed nausea vomiting diarrhea.  She had just visited a relative who had similar symptoms and figure that she had caught above.  The vomiting only lasted 24 hours and then resolved.  However she has been nauseated off and on since then also.  The vomitus is described as undigested food.  The diarrhea initially lasted only 3 days then resolved.  She was doing well for a few days and then the diarrhea resumed and she has had it off and on since then.  She states she's averaging 4 or 5 stools a day.  They are liquid and brown.  There were associated with lower abdominal cramping.  No blood or mucus in the stools.  No fever sweats or chills.  Her last stool was this morning and she notes that it was actually improved.  It was semi-formed.  She notes that the last 3 or 4 days she's been feeling unusually fatigued.  Very poor exercise tolerance and noting dyspnea on exertion.  She had not noticed any chest pain or lightheadedness.   With these symptoms she presented to her primary care physician's office and while there is noted to be significantly bradycardic and an EKG showed AV dissociation and she was transferred immediately to the emergency room.  She is  already been seen by cardiologist and has had a pacer placed.  She notes that she is feeling much better after the pacer placement.  No shortness of air.  No generalized sense of weakness.  In addition she has not noted any nausea or had any of the symptoms associated with her diarrhea.      Past Medical History:  No past medical history on file.  Past Surgical History:  No past surgical history on file.   Home Meds:  No prescriptions prior to admission.       Allergies:  Allergies   Allergen Reactions   • Penicillins    • Sulfur    • Latex Rash     Immunizations:    There is no immunization history on file for this patient.  Social History:   Social History     Social History Narrative   • No narrative on file     Social History   Substance Use Topics   • Smoking status: Unknown If Ever Smoked   • Smokeless tobacco: Not on file      Comment: Patient reported that she does not smoke   • Alcohol use Not on file     Family History:  No family history on file.     Review of Systems  Review of Systems   Constitutional:        As per history of present illness.  Otherwise negative.   HENT: Negative.    Eyes: Negative.    Respiratory:        As per history of present illness.  Otherwise negative.   Cardiovascular: Negative.    Gastrointestinal:        As per history of present illness.  Otherwise negative.   Endocrine: Negative.    Genitourinary: Negative.    Musculoskeletal:        Long-standing history of rheumatoid arthritis.  No acute complaints.   Skin: Negative.    Allergic/Immunologic: Negative.    Neurological: Negative.    Hematological: Negative.    Psychiatric/Behavioral: Negative.        Objective:  tMax 24 hrs: Temp (24hrs), Av.1 °F (36.7 °C), Min:97.7 °F (36.5 °C), Max:98.5 °F (36.9 °C)    Vitals Ranges:   Temp:  [97.7 °F (36.5 °C)-98.5 °F (36.9 °C)] 97.7 °F (36.5 °C)  Heart Rate:  [30-74] 74  Resp:  [16-24] 18  BP: ()/() 167/70      Exam:  Physical Exam   Constitutional: She is oriented to  person, place, and time. She appears well-developed and well-nourished. No distress.   HENT:   Head: Normocephalic and atraumatic.   Right Ear: External ear normal.   Left Ear: External ear normal.   Nose: Nose normal.   Mouth/Throat: Oropharynx is clear and moist.   Eyes: Conjunctivae and EOM are normal. Right eye exhibits no discharge. Left eye exhibits no discharge. No scleral icterus.   Neck: Neck supple. No JVD present. No tracheal deviation present. No thyromegaly present.   Cardiovascular: Normal rate, regular rhythm, normal heart sounds and intact distal pulses.    Presently in a paced rhythm.   Pulmonary/Chest: Effort normal and breath sounds normal. No stridor. No respiratory distress. She has no wheezes. She has no rales. She exhibits no tenderness.   Abdominal: Soft. Bowel sounds are normal. She exhibits no distension and no mass. There is no tenderness. There is no rebound and no guarding.   Musculoskeletal: She exhibits no edema or deformity.   She does have significant rheumatoid arthritis changes most notable in her hands and wrists.   Neurological: She is alert and oriented to person, place, and time. No cranial nerve deficit. She exhibits normal muscle tone. Coordination normal.   Skin: Skin is warm and dry. She is not diaphoretic.   Psychiatric: She has a normal mood and affect. Her behavior is normal. Judgment and thought content normal.       Data Review:  All labs and radiology reviewed.    Assessment:  Principal Problem:    Third degree heart block (CMS/HCC)  Active Problems:    Diarrhea    HTN (hypertension)    Hypothyroid    Rheumatoid arthritis (CMS/HCC)    COPD (chronic obstructive pulmonary disease) (CMS/[facility])      Plan:  Patient's been admitted and is already status post pacer placement and notes she's already feeling better.  Be very extreme to see if her nausea and diarrhea resolved after placement of the pacer.  She is already feeling better by her own history.  TSH is been on the high  side and going in and increase her Synthroid dose a bit.  If the diarrhea does recur we will check stool studies.  For full details please see orders.    Navarro Lee MD  7/13/2018  4:01 PM    EMR Dragon/Transcription disclaimer:   Much of this encounter note is an electronic transcription/translation of spoken language to printed text. The electronic translation of spoken language may permit erroneous, or at times, nonsensical words or phrases to be inadvertently transcribed; Although I have reviewed the note for such errors, some may still exist.

## 2018-07-14 LAB
ANION GAP SERPL CALCULATED.3IONS-SCNC: 14.4 MMOL/L
BUN BLD-MCNC: 15 MG/DL (ref 8–23)
BUN/CREAT SERPL: 16.9 (ref 7–25)
CALCIUM SPEC-SCNC: 8.8 MG/DL (ref 8.6–10.5)
CHLORIDE SERPL-SCNC: 106 MMOL/L (ref 98–107)
CO2 SERPL-SCNC: 17.6 MMOL/L (ref 22–29)
CREAT BLD-MCNC: 0.89 MG/DL (ref 0.57–1)
DEPRECATED RDW RBC AUTO: 49.3 FL (ref 37–54)
ERYTHROCYTE [DISTWIDTH] IN BLOOD BY AUTOMATED COUNT: 14.1 % (ref 11.7–13)
GFR SERPL CREATININE-BSD FRML MDRD: 63 ML/MIN/1.73
GLUCOSE BLD-MCNC: 98 MG/DL (ref 65–99)
HCT VFR BLD AUTO: 36.9 % (ref 35.6–45.5)
HGB BLD-MCNC: 12 G/DL (ref 11.9–15.5)
MCH RBC QN AUTO: 31.4 PG (ref 26.9–32)
MCHC RBC AUTO-ENTMCNC: 32.5 G/DL (ref 32.4–36.3)
MCV RBC AUTO: 96.6 FL (ref 80.5–98.2)
PLATELET # BLD AUTO: 166 10*3/MM3 (ref 140–500)
PMV BLD AUTO: 10 FL (ref 6–12)
POTASSIUM BLD-SCNC: 4.4 MMOL/L (ref 3.5–5.2)
RBC # BLD AUTO: 3.82 10*6/MM3 (ref 3.9–5.2)
SODIUM BLD-SCNC: 138 MMOL/L (ref 136–145)
WBC NRBC COR # BLD: 6.73 10*3/MM3 (ref 4.5–10.7)

## 2018-07-14 PROCEDURE — 80048 BASIC METABOLIC PNL TOTAL CA: CPT | Performed by: HOSPITALIST

## 2018-07-14 PROCEDURE — 94799 UNLISTED PULMONARY SVC/PX: CPT

## 2018-07-14 PROCEDURE — 25010000002 ONDANSETRON PER 1 MG: Performed by: HOSPITALIST

## 2018-07-14 PROCEDURE — 25010000002 FUROSEMIDE PER 20 MG: Performed by: INTERNAL MEDICINE

## 2018-07-14 PROCEDURE — 85027 COMPLETE CBC AUTOMATED: CPT | Performed by: HOSPITALIST

## 2018-07-14 PROCEDURE — 99232 SBSQ HOSP IP/OBS MODERATE 35: CPT | Performed by: NURSE PRACTITIONER

## 2018-07-14 RX ORDER — FUROSEMIDE 10 MG/ML
40 INJECTION INTRAMUSCULAR; INTRAVENOUS ONCE
Status: COMPLETED | OUTPATIENT
Start: 2018-07-14 | End: 2018-07-14

## 2018-07-14 RX ADMIN — BUDESONIDE AND FORMOTEROL FUMARATE DIHYDRATE 2 PUFF: 80; 4.5 AEROSOL RESPIRATORY (INHALATION) at 20:09

## 2018-07-14 RX ADMIN — LEVOTHYROXINE SODIUM 75 MCG: 75 TABLET ORAL at 07:02

## 2018-07-14 RX ADMIN — ASPIRIN 81 MG: 81 TABLET ORAL at 16:51

## 2018-07-14 RX ADMIN — LISINOPRIL 20 MG: 20 TABLET ORAL at 09:37

## 2018-07-14 RX ADMIN — FOLIC ACID 1 MG: 1 TABLET ORAL at 20:58

## 2018-07-14 RX ADMIN — PANTOPRAZOLE SODIUM 40 MG: 40 TABLET, DELAYED RELEASE ORAL at 07:02

## 2018-07-14 RX ADMIN — FOLIC ACID 1 MG: 1 TABLET ORAL at 09:37

## 2018-07-14 RX ADMIN — FUROSEMIDE 40 MG: 10 INJECTION, SOLUTION INTRAMUSCULAR; INTRAVENOUS at 14:02

## 2018-07-14 RX ADMIN — BUDESONIDE AND FORMOTEROL FUMARATE DIHYDRATE 2 PUFF: 80; 4.5 AEROSOL RESPIRATORY (INHALATION) at 07:28

## 2018-07-14 RX ADMIN — ONDANSETRON 4 MG: 2 INJECTION INTRAMUSCULAR; INTRAVENOUS at 20:58

## 2018-07-14 RX ADMIN — ONDANSETRON 4 MG: 2 INJECTION INTRAMUSCULAR; INTRAVENOUS at 08:25

## 2018-07-14 RX ADMIN — HYDROXYCHLOROQUINE SULFATE 200 MG: 200 TABLET, FILM COATED ORAL at 16:51

## 2018-07-14 RX ADMIN — METOPROLOL SUCCINATE 25 MG: 25 TABLET, FILM COATED, EXTENDED RELEASE ORAL at 09:37

## 2018-07-14 RX ADMIN — SERTRALINE 100 MG: 100 TABLET, FILM COATED ORAL at 20:58

## 2018-07-14 NOTE — PLAN OF CARE
Problem: Patient Care Overview  Goal: Plan of Care Review  Outcome: Ongoing (interventions implemented as appropriate)   07/14/18 1655   Coping/Psychosocial   Plan of Care Reviewed With patient   Plan of Care Review   Progress improving   OTHER   Outcome Summary Left chest pacemaker site soft and dry. Denies chest pain/SOA. C/O nausea, vomited green emesis x1 this morning. Medicated with IV zofran with relief. Tenderness RUQ - Dr. Bolaños notified when on unit. IVF d/c'd. IV lasix x1. diurising well. Will monitor     Goal: Individualization and Mutuality  Outcome: Ongoing (interventions implemented as appropriate)    Goal: Discharge Needs Assessment  Outcome: Ongoing (interventions implemented as appropriate)      Problem: Cardiac Rhythm Management Device (Adult)  Goal: Signs and Symptoms of Listed Potential Problems Will be Absent, Minimized or Managed (Cardiac Rhythm Management Device)   07/14/18 1655   Goal/Outcome Evaluation   Problems Assessed (Cardiac Rhythm Management Device) all   Problems Present (Cardiac Rhythm Dev) none

## 2018-07-14 NOTE — PLAN OF CARE
Problem: Patient Care Overview  Goal: Plan of Care Review  Outcome: Ongoing (interventions implemented as appropriate)   07/14/18 6996   Coping/Psychosocial   Plan of Care Reviewed With patient   Plan of Care Review   Progress no change   OTHER   Outcome Summary continue inhaler for optimal breathing

## 2018-07-14 NOTE — PROGRESS NOTES
"    Patient Name: Barbara Segovia  :1945  72 y.o.      Patient Care Team:  Ramiro Briones MD as PCP - General (Internal Medicine)    Chief Complaint: follow up complete heart block    Interval History: Complains of nausea and diarrhea this morning. Also has a new head ache. PPM incision with clean dressing on. No bleeding noted. Sore but not too painful.        Objective   Vital Signs  Temp:  [97.7 °F (36.5 °C)-98.5 °F (36.9 °C)] 98 °F (36.7 °C)  Heart Rate:  [30-77] 63  Resp:  [16-24] 20  BP: ()/() 169/83    Intake/Output Summary (Last 24 hours) at 18 0742  Last data filed at 18 1700   Gross per 24 hour   Intake             1240 ml   Output                0 ml   Net             1240 ml     Flowsheet Rows      First Filed Value   Admission Height  154.9 cm (61\") Documented at 2018 1214   Admission Weight  77.6 kg (171 lb 1.6 oz) Documented at 2018 1214          Physical Exam:   General Appearance:    Alert, cooperative, in no acute distress   Lungs:     Clear to auscultation.  Normal respiratory effort and rate.      Heart:    Regular rhythm and normal rate, normal S1 and S2, no murmurs, gallops or rubs.     Chest Wall:    No abnormalities observed   Abdomen:     Soft, nontender, positive bowel sounds.     Extremities:   no cyanosis, clubbing or edema.  No marked joint deformities.  Adequate musculoskeletal strength.    Left chest incision - with gauze and tegaderm. No evidence of bleeding or hematoma.      Results Review:      Results from last 7 days  Lab Units 18  0447   SODIUM mmol/L 138   POTASSIUM mmol/L 4.4   CHLORIDE mmol/L 106   CO2 mmol/L 17.6*   BUN mg/dL 15   CREATININE mg/dL 0.89   GLUCOSE mg/dL 98   CALCIUM mg/dL 8.8       Results from last 7 days  Lab Units 18  1219   TROPONIN T ng/mL <0.010       Results from last 7 days  Lab Units 18  0447   WBC 10*3/mm3 6.73   HEMOGLOBIN g/dL 12.0   HEMATOCRIT % 36.9   PLATELETS 10*3/mm3 166 "       Results from last 7 days  Lab Units 07/13/18  1219   INR  1.10   APTT seconds 28.0       Results from last 7 days  Lab Units 07/13/18  1219   MAGNESIUM mg/dL 2.1                   Medication Review:     aspirin 81 mg Oral Q PM   budesonide-formoterol 2 puff Inhalation BID - RT   calcium carbonate (oyster shell) 500 mg Oral Daily   cholecalciferol 1,000 Units Oral Daily   folic acid 1 mg Oral BID   hydroxychloroquine 200 mg Oral Q PM   levothyroxine 75 mcg Oral Q AM   lisinopril 20 mg Oral Daily   [START ON 7/18/2018] methotrexate 10 mg Oral Weekly   metoprolol succinate XL 25 mg Oral Daily   pantoprazole 40 mg Oral QAM   sertraline 100 mg Oral Daily   Tofacitinib Citrate 5 mg Oral Q12H   vitamin B-12 500 mcg Oral Daily          sodium chloride 50 mL/hr Last Rate: 50 mL/hr (07/13/18 1847)       Assessment/Plan   1. Complete heart block (symptomatic) s/p dual chamber permanent pace maker (boolino) 7/13. Interrogated this morning and functioning properly. She will need to follow up with the device clinic in 7-10 days and see Dr. Castillo in 4 weeks.   2. HTN - significantly elevated on admission. She has been started on metoprolol succinate. BP is improving. Continue to monitor.   3. Hypothyroidism - TSH slightly elevated. Synthroid was increased. Not significant enough to be contributing to bradycardia.   4. Nausea/vomiting/diarrhea - seemed to resolve yesterday afternoon, however she started to feel poorly again this morning with nausea and diarrhea. Defer to internal medicine.     JOSE A Vogel  [facility]  07/14/18  7:42 AM

## 2018-07-14 NOTE — PROGRESS NOTES
Name: Barbara Segovia ADMIT: 2018   : 1945  PCP: Ramiro Briones MD    MRN: [id number] LOS: 1 days   AGE/SEX: 72 y.o. female    ROOM: [facility]   Subjective   Status post pacemaker  Complaints of nausea    Brief hospital course since admission:  Complete heart block  S/p pace maker  Hypothyroidism  Hypertension  Rheumatoid arthritis on Plaquenil and methotrexate weekly    I have reviewed past medical history, social hisotory, family history, allergies.  No changes from admission note.      Review of Systems   Constitutional: Negative for fever.   Respiratory: Negative for shortness of breath.    Cardiovascular: Positive for leg swelling. Negative for chest pain.   Gastrointestinal: Positive for diarrhea, nausea and vomiting.        Objective   Vital Signs  Temp:  [97.7 °F (36.5 °C)-98.2 °F (36.8 °C)] 97.8 °F (36.6 °C)  Heart Rate:  [30-77] 73  Resp:  [16-22] 16  BP: ()/(49-83) 154/67  SpO2:  [95 %-100 %] 95 %  on   ;   Device (Oxygen Therapy): room air  Body mass index is 32.31 kg/m².    Intake/Output Summary (Last 24 hours) at 18 1343  Last data filed at 18 1208   Gross per 24 hour   Intake             1360 ml   Output              750 ml   Net              610 ml       Physical Exam   Constitutional: She is oriented to person, place, and time. She appears well-developed and well-nourished. No distress.   HENT:   Head: Normocephalic and atraumatic.   Eyes: Pupils are equal, round, and reactive to light. No scleral icterus.   Cardiovascular: Normal rate and regular rhythm.    Paced rhythm 100%   Pulmonary/Chest: Effort normal. No respiratory distress. She has no decreased breath sounds. She has no wheezes.   Abdominal: Soft. Bowel sounds are normal. There is no hepatosplenomegaly.   Musculoskeletal: She exhibits edema.   Neurological: She is alert and oriented to person, place, and time.   Skin: No cyanosis. Nails show no clubbing.   Psychiatric: She has a normal mood and affect.  Her behavior is normal.       Results Review:      Results from last 7 days  Lab Units 07/14/18  0447 07/13/18  1219   WBC 10*3/mm3 6.73 5.70   HEMOGLOBIN g/dL 12.0 12.4   HEMATOCRIT % 36.9 37.8   PLATELETS 10*3/mm3 166 201       Results from last 7 days  Lab Units 07/14/18  0447 07/13/18  1219   SODIUM mmol/L 138 136   POTASSIUM mmol/L 4.4 4.6   CHLORIDE mmol/L 106 101   CO2 mmol/L 17.6* 18.5*   BUN mg/dL 15 23   CREATININE mg/dL 0.89 1.09*   GLUCOSE mg/dL 98 81   CALCIUM mg/dL 8.8 9.4       Results from last 7 days  Lab Units 07/13/18  1219   INR  1.10     Hemoglobin A1C:No results found for: HGBA1C  Glucose Range:No results found for: POCGLU      aspirin 81 mg Oral Q PM   budesonide-formoterol 2 puff Inhalation BID - RT   calcium carbonate (oyster shell) 500 mg Oral Daily   cholecalciferol 1,000 Units Oral Daily   folic acid 1 mg Oral BID   furosemide 40 mg Intravenous Once   hydroxychloroquine 200 mg Oral Q PM   levothyroxine 75 mcg Oral Q AM   lisinopril 20 mg Oral Daily   [START ON 7/18/2018] methotrexate 10 mg Oral Weekly   metoprolol succinate XL 25 mg Oral Daily   pantoprazole 40 mg Oral QAM   sertraline 100 mg Oral Daily   Tofacitinib Citrate 5 mg Oral Q12H   vitamin B-12 500 mcg Oral Daily      Diet Regular; Consistent Carbohydrate, Cardiac  Diet Regular  Assessment/Plan       Assessment/Plan      Active Hospital Problems    Diagnosis Date Noted   • **Third degree heart block (CMS/HCC) [I44.2] 07/13/2018   • Diarrhea [R19.7] 07/13/2018   • HTN (hypertension) [I10] 07/13/2018   • Hypothyroid [E03.9] 07/13/2018   • Rheumatoid arthritis (CMS/HCC) [M06.9] 07/13/2018   • COPD (chronic obstructive pulmonary disease) (CMS/HCC) [J44.9] 07/13/2018      Resolved Hospital Problems    Diagnosis Date Noted Date Resolved   No resolved problems to display.       I suspect that her nausea and vomiting is due to hepatic congestion I will stop her fluids and given her 1 dose of Lasix and repeat labs in the morning and  possibly home on Sunday        Jose Manuel Bolaños MD  [facility]  07/14/18

## 2018-07-15 VITALS
RESPIRATION RATE: 16 BRPM | DIASTOLIC BLOOD PRESSURE: 61 MMHG | HEART RATE: 70 BPM | TEMPERATURE: 97.9 F | BODY MASS INDEX: 32.28 KG/M2 | SYSTOLIC BLOOD PRESSURE: 106 MMHG | WEIGHT: 171 LBS | OXYGEN SATURATION: 95 % | HEIGHT: 61 IN

## 2018-07-15 LAB
ALBUMIN SERPL-MCNC: 3.8 G/DL (ref 3.5–5.2)
ALBUMIN/GLOB SERPL: 1.6 G/DL
ALP SERPL-CCNC: 65 U/L (ref 39–117)
ALT SERPL W P-5'-P-CCNC: 10 U/L (ref 1–33)
ANION GAP SERPL CALCULATED.3IONS-SCNC: 15.8 MMOL/L
AST SERPL-CCNC: 15 U/L (ref 1–32)
BILIRUB SERPL-MCNC: 0.6 MG/DL (ref 0.1–1.2)
BUN BLD-MCNC: 11 MG/DL (ref 8–23)
BUN/CREAT SERPL: 11.3 (ref 7–25)
CALCIUM SPEC-SCNC: 8.7 MG/DL (ref 8.6–10.5)
CHLORIDE SERPL-SCNC: 102 MMOL/L (ref 98–107)
CO2 SERPL-SCNC: 20.2 MMOL/L (ref 22–29)
CREAT BLD-MCNC: 0.97 MG/DL (ref 0.57–1)
GFR SERPL CREATININE-BSD FRML MDRD: 57 ML/MIN/1.73
GLOBULIN UR ELPH-MCNC: 2.4 GM/DL
GLUCOSE BLD-MCNC: 105 MG/DL (ref 65–99)
NT-PROBNP SERPL-MCNC: 7554 PG/ML (ref 0–900)
POTASSIUM BLD-SCNC: 4.2 MMOL/L (ref 3.5–5.2)
PROT SERPL-MCNC: 6.2 G/DL (ref 6–8.5)
SODIUM BLD-SCNC: 138 MMOL/L (ref 136–145)

## 2018-07-15 PROCEDURE — 94799 UNLISTED PULMONARY SVC/PX: CPT

## 2018-07-15 PROCEDURE — 80053 COMPREHEN METABOLIC PANEL: CPT | Performed by: INTERNAL MEDICINE

## 2018-07-15 PROCEDURE — 83880 ASSAY OF NATRIURETIC PEPTIDE: CPT | Performed by: INTERNAL MEDICINE

## 2018-07-15 PROCEDURE — 99231 SBSQ HOSP IP/OBS SF/LOW 25: CPT | Performed by: NURSE PRACTITIONER

## 2018-07-15 RX ORDER — METOPROLOL SUCCINATE 25 MG/1
25 TABLET, EXTENDED RELEASE ORAL DAILY
Qty: 30 TABLET | Refills: 1 | Status: SHIPPED | OUTPATIENT
Start: 2018-07-16 | End: 2018-08-16 | Stop reason: SDUPTHER

## 2018-07-15 RX ADMIN — PANTOPRAZOLE SODIUM 40 MG: 40 TABLET, DELAYED RELEASE ORAL at 06:51

## 2018-07-15 RX ADMIN — CALCIUM 500 MG: 500 TABLET ORAL at 08:27

## 2018-07-15 RX ADMIN — ACETAMINOPHEN 650 MG: 325 TABLET, FILM COATED ORAL at 09:33

## 2018-07-15 RX ADMIN — METOPROLOL SUCCINATE 25 MG: 25 TABLET, FILM COATED, EXTENDED RELEASE ORAL at 08:27

## 2018-07-15 RX ADMIN — LISINOPRIL 20 MG: 20 TABLET ORAL at 08:27

## 2018-07-15 RX ADMIN — LEVOTHYROXINE SODIUM 75 MCG: 75 TABLET ORAL at 06:51

## 2018-07-15 RX ADMIN — BUDESONIDE AND FORMOTEROL FUMARATE DIHYDRATE 2 PUFF: 80; 4.5 AEROSOL RESPIRATORY (INHALATION) at 07:08

## 2018-07-15 RX ADMIN — VITAMIN D, TAB 1000IU (100/BT) 1000 UNITS: 25 TAB at 08:27

## 2018-07-15 RX ADMIN — Medication 500 MCG: at 08:27

## 2018-07-15 RX ADMIN — FOLIC ACID 1 MG: 1 TABLET ORAL at 08:27

## 2018-07-15 RX ADMIN — HYDROCODONE BITARTRATE AND ACETAMINOPHEN 1 TABLET: 7.5; 325 TABLET ORAL at 04:40

## 2018-07-15 NOTE — DISCHARGE SUMMARY
Name: Barbara Segovia  Age: 72 y.o.  Sex: female  :  1945  MRN: [id number]         Primary Care Physician: Ramiro Briones MD      Date of Admission:  2018  Date of Discharge:  7/15/2018      CHIEF COMPLAINT     Slow Heart Rate (third degree block on EMS ECG)      DISCHARGE DIAGNOSIS  Active Hospital Problems    Diagnosis Date Noted   • **Third degree heart block (CMS/HCC) [I44.2] 2018   • Diarrhea [R19.7] 2018   • HTN (hypertension) [I10] 2018   • Hypothyroid [E03.9] 2018   • Rheumatoid arthritis (CMS/HCC) [M06.9] 2018   • COPD (chronic obstructive pulmonary disease) (CMS/HCC) [J44.9] 2018      Resolved Hospital Problems    Diagnosis Date Noted Date Resolved   No resolved problems to display.       SECONDARY DIAGNOSES  Past Medical History:   Diagnosis Date   • Arthritis     rheumatoid arthritis   • Asthma    • Cancer (CMS/HCC)     basal cell skin cancer   • Disease of thyroid gland    • Elevated cholesterol    • GERD (gastroesophageal reflux disease)    • History of transfusion    • Hypertension          PROCEDURES PERFORMED  Pacemaker insertion 2018      HOSPITAL COURSE  This is a 72-year-old female was sent to emergency room from her primary is because of the third-degree heart block.  She was found to be having complete heart block and also symptoms of orthopnea which is going on for 3 months.  She was immediately taken to catheter lab and had a pacemaker insertion.  After the pacemaker insertion the pacemaker check showed that it was functioning normally.  However she complains of having right upper quadrant pain and discomfort.  Her proBNP was elevated.  She was given 1 dose of Lasix because of hepatic congestion and the symptoms have since resolved.  She also has significant rheumatoid arthritis for which she is taking medications.Patient is stable and will be discharged home in a stable condition with an instruction to follow up in pacemaker  [place] in about a week and Dr. Castillo in about a month      PHYSICAL EXAM  Temp:  [97.8 °F (36.6 °C)-98 °F (36.7 °C)] 97.9 °F (36.6 °C)  Heart Rate:  [61-73] 70  Resp:  [16] 16  BP: (106-155)/(59-74) 106/61  Body mass index is 32.31 kg/m².  Physical Exam  HEENT: Unremarkable, pupils are round equal and reacting to light   NECK: No lymphadenopathy, throat is clear,   RESPRATORY SYSTEM: Breath sounds are equal on both sides and are normal, no wheezes or crackles  CARDIOVASULAR SYSTEM: Heart rate is regular without murmur, 100% paced rhythm  ABDOMEN: Soft, no ascites, no hepatosplenomegaly.  EXTREMITIES: No cyanosis, clubbing or edema    CONDITION ON DISCHARGE  Stable.      DISCHARGE DISPOSITION   Home      ALLERGIES  Allergies   Allergen Reactions   • Penicillins    • Sulfur    • Latex Rash       RECENT LABS    Results from last 7 days  Lab Units 07/14/18  0447 07/13/18  1219   WBC 10*3/mm3 6.73 5.70   HEMOGLOBIN g/dL 12.0 12.4   HEMATOCRIT % 36.9 37.8   PLATELETS 10*3/mm3 166 201       Results from last 7 days  Lab Units 07/15/18  0340 07/14/18  0447 07/13/18  1219   SODIUM mmol/L 138 138 136   POTASSIUM mmol/L 4.2 4.4 4.6   CHLORIDE mmol/L 102 106 101   CO2 mmol/L 20.2* 17.6* 18.5*   BUN mg/dL 11 15 23   CREATININE mg/dL 0.97 0.89 1.09*   GLUCOSE mg/dL 105* 98 81   CALCIUM mg/dL 8.7 8.8 9.4       Results from last 7 days  Lab Units 07/13/18  1219   INR  1.10       DIET;  Diet Order   Procedures   • Diet Regular       DISCHARGE MEDICATIONS     Your medication list      START taking these medications      Instructions Last Dose Given Next Dose Due   metoprolol succinate XL 25 MG 24 hr tablet  Commonly known as:  TOPROL-XL  Start taking on:  7/16/2018      Take 1 tablet by mouth Daily.          CONTINUE taking these medications      Instructions Last Dose Given Next Dose Due   albuterol 108 (90 Base) MCG/ACT inhaler  Commonly known as:  PROVENTIL HFA;VENTOLIN HFA      Inhale 2 puffs Every 4 (Four) Hours As Needed for  Wheezing.       aspirin 81 MG EC tablet      Take 81 mg by mouth Every Evening.       benazepril 20 MG tablet  Commonly known as:  LOTENSIN      Take 20 mg by mouth Daily.       calcium carbonate 600 MG tablet  Commonly known as:  OS-CANDIDO      Take 900 mg by mouth Every Evening.       cholecalciferol 1000 units tablet  Commonly known as:  VITAMIN D3      Take 1,000 Units by mouth Daily.       cholecalciferol 400 units tablet  Commonly known as:  VITAMIN D3      Take 1,200 Units by mouth Every Evening.       fluticasone-salmeterol 100-50 MCG/DOSE DISKUS  Commonly known as:  ADVAIR      Inhale 2 (Two) Times a Day As Needed.       folic acid 1 MG tablet  Commonly known as:  FOLVITE      Take 1 mg by mouth 2 (Two) Times a Day.       HYDROcodone-acetaminophen 7.5-325 MG per tablet  Commonly known as:  NORCO      Take 1 tablet by mouth 2 (Two) Times a Day As Needed for Moderate Pain .       hydroxychloroquine 200 MG tablet  Commonly known as:  PLAQUENIL      Take 200 mg by mouth Every Evening.       levothyroxine 125 MCG tablet  Commonly known as:  SYNTHROID, LEVOTHROID      Take 62.5 mcg by mouth Daily.       methotrexate 2.5 MG tablet      Take 10 mg by mouth 1 (One) Time Per Week. wednesdays       RABEprazole 20 MG EC tablet  Commonly known as:  ACIPHEX      Take 20 mg by mouth Daily.       sertraline 100 MG tablet  Commonly known as:  ZOLOFT      Take 100 mg by mouth Daily.       vitamin B-12 500 MCG tablet  Commonly known as:  CYANOCOBALAMIN      Take 500 mcg by mouth Daily.       XELJANZ 5 MG tablet  Generic drug:  Tofacitinib Citrate      Take 5 mg by mouth 2 (Two) Times a Day.             Where to Get Your Medications      These medications were sent to [facility] [address] [place] [address] - [address] [place] [address] AT [address] - [phone]  - [phone]  [address] [facility] [address]    Phone:  [phone]   · metoprolol succinate XL 25 MG 24 hr tablet        No future  appointments.  Follow-up Information     Duke Castillo MD Follow up in 1 month(s).    Specialty:  Cardiology  Contact information:  [address] [facility] [address]  [facility] [id number] [phone]             [facility] CARDIOLOGY Follow up in 1 week(s).    Specialty:  Cardiology  Why:  Pacemaker clinic  Contact information:  [address] [facility]  [facility] [address] [phone]           Ramiro Briones MD .    Specialty:  Internal Medicine  Contact information:  [address] [place] [address] 1  [facility] [address]  [phone]                     CODE STATUS  Code Status and Medical Interventions:   Ordered at: 07/13/18 1733     Code Status:    CPR     Medical Interventions (Level of Support Prior to Arrest):    Full           Jose Manuel Bolaños MD  [facility]  07/15/18      Time: greater than 35 minutes.

## 2018-07-15 NOTE — PROGRESS NOTES
"    Patient Name: Barbara Segovia  :1945  72 y.o.      Patient Care Team:  Ramiro Briones MD as PCP - General (Internal Medicine)    Chief Complaint: follow up complete heart block    Interval History: She feels much better this morning. No nausea. Some tenderness from incision treated with tylenol.        Objective   Vital Signs  Temp:  [97.8 °F (36.6 °C)-98 °F (36.7 °C)] 97.9 °F (36.6 °C)  Heart Rate:  [61-73] 70  Resp:  [16] 16  BP: (106-155)/(59-74) 106/61    Intake/Output Summary (Last 24 hours) at 07/15/18 0901  Last data filed at 07/15/18 0746   Gross per 24 hour   Intake              260 ml   Output             2700 ml   Net            -2440 ml     Flowsheet Rows      First Filed Value   Admission Height  154.9 cm (61\") Documented at 2018 1214   Admission Weight  77.6 kg (171 lb 1.6 oz) Documented at 2018 1214          Physical Exam:   General Appearance:    Alert, cooperative, in no acute distress   Lungs:     Clear to auscultation.  Normal respiratory effort and rate.      Heart:    Regular rhythm and normal rate, normal S1 and S2, no murmurs, gallops or rubs.     Chest Wall:    No abnormalities observed   Abdomen:     Soft, nontender, positive bowel sounds.     Extremities:   no cyanosis, clubbing or edema.  No marked joint deformities.  Adequate musculoskeletal strength.    Left chest incision - with gauze and tegaderm. No evidence of bleeding or hematoma.     Above is unchanged.      Results Review:      Results from last 7 days  Lab Units 07/15/18  0340   SODIUM mmol/L 138   POTASSIUM mmol/L 4.2   CHLORIDE mmol/L 102   CO2 mmol/L 20.2*   BUN mg/dL 11   CREATININE mg/dL 0.97   GLUCOSE mg/dL 105*   CALCIUM mg/dL 8.7       Results from last 7 days  Lab Units 18  1219   TROPONIN T ng/mL <0.010       Results from last 7 days  Lab Units 18  0447   WBC 10*3/mm3 6.73   HEMOGLOBIN g/dL 12.0   HEMATOCRIT % 36.9   PLATELETS 10*3/mm3 166       Results from last 7 days  Lab Units " 07/13/18  1219   INR  1.10   APTT seconds 28.0       Results from last 7 days  Lab Units 07/13/18  1219   MAGNESIUM mg/dL 2.1                   Medication Review:     aspirin 81 mg Oral Q PM   budesonide-formoterol 2 puff Inhalation BID - RT   calcium carbonate (oyster shell) 500 mg Oral Daily   cholecalciferol 1,000 Units Oral Daily   folic acid 1 mg Oral BID   hydroxychloroquine 200 mg Oral Q PM   levothyroxine 75 mcg Oral Q AM   lisinopril 20 mg Oral Daily   [START ON 7/18/2018] methotrexate 10 mg Oral Weekly   metoprolol succinate XL 25 mg Oral Daily   pantoprazole 40 mg Oral QAM   sertraline 100 mg Oral Daily   Tofacitinib Citrate 5 mg Oral Q12H   vitamin B-12 500 mcg Oral Daily             Assessment/Plan   1. Complete heart block (symptomatic) s/p dual chamber permanent pace maker ([employer]) 7/13. Interrogated this morning and functioning properly. She will need to follow up with the device clinic in 7-10 days and see Dr. Castillo in 4 weeks.   2. HTN - significantly elevated on admission. She has been started on metoprolol succinate. BP is improving. Continue to monitor.   3. Hypothyroidism - TSH slightly elevated. Synthroid was increased. Not significant enough to be contributing to bradycardia.   4. Nausea/vomiting/diarrhea - seemed to resolve yesterday afternoon, however she started to feel poorly again this morning with nausea and diarrhea. Defer to internal medicine. --- better today    OK for discharge from a cardiac standpoint. Follow up as above.     JOSE A Vogel  [facility]  07/15/18  9:01 AM

## 2018-07-15 NOTE — PLAN OF CARE
Problem: Patient Care Overview  Goal: Plan of Care Review  Outcome: Ongoing (interventions implemented as appropriate)   07/15/18 0553   Plan of Care Review   Progress no change   OTHER   Outcome Summary PM site drsg CDI, pt denies pain at site, c/o generalized pain r/t arthritis, releived with prn pain medicaition, rested well through out night, VSS     Goal: Discharge Needs Assessment  Outcome: Ongoing (interventions implemented as appropriate)      Problem: Cardiac Rhythm Management Device (Adult)  Goal: Signs and Symptoms of Listed Potential Problems Will be Absent, Minimized or Managed (Cardiac Rhythm Management Device)  Outcome: Ongoing (interventions implemented as appropriate)

## 2018-07-15 NOTE — PLAN OF CARE
Problem: Patient Care Overview  Goal: Individualization and Mutuality  Outcome: Outcome(s) achieved Date Met: 07/15/18    Goal: Discharge Needs Assessment  Outcome: Outcome(s) achieved Date Met: 07/15/18   07/15/18 1016   Discharge Needs Assessment   Readmission Within the Last 30 Days no previous admission in last 30 days   Concerns to be Addressed denies needs/concerns at this time   Patient/Family Anticipates Transition to home   Patient/Family Anticipated Services at Transition none   Transportation Concerns car, none   Transportation Anticipated family or friend will provide   Anticipated Changes Related to Illness none   Equipment Needed After Discharge wheelchair, power;walker, rolling   Offered/Gave Vendor List no   Disability   Equipment Currently Used at Home wheelchair, motorized;walker, rolling     Goal: Interprofessional Rounds/Family Conf  Outcome: Outcome(s) achieved Date Met: 07/15/18   07/15/18 1016   Interdisciplinary Rounds/Family Conf   Participants ;physician;family;nursing;patient

## 2018-07-16 ENCOUNTER — TELEPHONE (OUTPATIENT)
Dept: CARDIOLOGY | Facility: CLINIC | Age: 73
End: 2018-07-16

## 2018-07-16 NOTE — TELEPHONE ENCOUNTER
I called pt to schedule and she states no way to make it today and she is at least an hour away and has to get ready before she can leave. I offered her our latest apt for today.  She will be here at 0900 tomorrow (Tuesday).  Would you like her to do anything before tomorrow.

## 2018-07-16 NOTE — TELEPHONE ENCOUNTER
7/16 7/16 Pt  called and said pt incision site is swollen and she can't move or bend her fingers.    Please advise    Thank you    Jasiel TORREZ

## 2018-07-16 NOTE — TELEPHONE ENCOUNTER
I called pt and instructed her to keep her hand elevated.  She said she did not get prescription for pain med.  I told her I would let you know.  I am leaving in the next 5-10 min and will not be returning till Wednesday, so if she needs to be contacted about anything before her apt tomorrow will need to send to someone else.

## 2018-07-16 NOTE — TELEPHONE ENCOUNTER
I spoke with pt she had a Pacemaker place on 7/13/18.She woke this morning with her hand to end of fingers swollen up to her wrist. She states it hurts to move and can't bend them.   She has no other symptoms.   Please advise.   Pt's call  Back # [phone]    Thanks Beltran RANGEL

## 2018-07-17 ENCOUNTER — CLINICAL SUPPORT NO REQUIREMENTS (OUTPATIENT)
Dept: CARDIOLOGY | Facility: CLINIC | Age: 73
End: 2018-07-17

## 2018-07-17 DIAGNOSIS — I44.2 THIRD DEGREE HEART BLOCK (HCC): Primary | ICD-10-CM

## 2018-07-17 PROCEDURE — 93280 PM DEVICE PROGR EVAL DUAL: CPT | Performed by: INTERNAL MEDICINE

## 2018-07-17 NOTE — TELEPHONE ENCOUNTER
Dr. Castillo saw in clinic today. Ordered elevation and ice. He wrote her a pain medication Rx. She is to call in the next two weeks to report how the swelling is. She will call sooner if it worsens.     She is scheduled to see Dr. Castillo in 3 weeks as post hospital f/u.

## 2018-07-23 ENCOUNTER — CLINICAL SUPPORT NO REQUIREMENTS (OUTPATIENT)
Dept: CARDIOLOGY | Facility: CLINIC | Age: 73
End: 2018-07-23

## 2018-07-23 DIAGNOSIS — I44.2 COMPLETE HEART BLOCK (HCC): Primary | ICD-10-CM

## 2018-08-15 ENCOUNTER — CLINICAL SUPPORT NO REQUIREMENTS (OUTPATIENT)
Dept: CARDIOLOGY | Facility: CLINIC | Age: 73
End: 2018-08-15

## 2018-08-15 ENCOUNTER — TELEPHONE (OUTPATIENT)
Dept: CARDIOLOGY | Facility: CLINIC | Age: 73
End: 2018-08-15

## 2018-08-15 DIAGNOSIS — I44.2 THIRD DEGREE AV BLOCK (HCC): Primary | ICD-10-CM

## 2018-08-15 NOTE — TELEPHONE ENCOUNTER
It is noted that since her initial in office pacer check 1 month ago that atrial lead imp has gone up by 250 ohms, atrial intrinsic amplitude has tripled to 6.2mv and atrial auto threshold has been unable to run since 8/7/18.  She has an apt to see Dr. Castillo tomorrow, so we will plan to assess atrial lead function further at that time.   It is noted (per daily lead measurements) that atrial amplitude of 6.2 is isolated to today, impedance has been a gradual increase and that last day atrial threshold ran it was normal and that it has intermittently not been able to run since she was her 1 month ago.

## 2018-08-16 ENCOUNTER — CLINICAL SUPPORT NO REQUIREMENTS (OUTPATIENT)
Dept: CARDIOLOGY | Facility: CLINIC | Age: 73
End: 2018-08-16

## 2018-08-16 ENCOUNTER — OFFICE VISIT (OUTPATIENT)
Dept: CARDIOLOGY | Facility: CLINIC | Age: 73
End: 2018-08-16

## 2018-08-16 VITALS
DIASTOLIC BLOOD PRESSURE: 80 MMHG | SYSTOLIC BLOOD PRESSURE: 134 MMHG | HEART RATE: 84 BPM | BODY MASS INDEX: 28.51 KG/M2 | HEIGHT: 61 IN | WEIGHT: 151 LBS

## 2018-08-16 DIAGNOSIS — I10 ESSENTIAL HYPERTENSION: ICD-10-CM

## 2018-08-16 DIAGNOSIS — I44.2 THIRD DEGREE HEART BLOCK (HCC): Primary | ICD-10-CM

## 2018-08-16 DIAGNOSIS — I44.2 THIRD DEGREE AV BLOCK (HCC): Primary | ICD-10-CM

## 2018-08-16 PROCEDURE — 99024 POSTOP FOLLOW-UP VISIT: CPT | Performed by: INTERNAL MEDICINE

## 2018-08-16 RX ORDER — RANITIDINE 150 MG/1
150 TABLET ORAL NIGHTLY
COMMUNITY
End: 2019-08-22

## 2018-08-16 RX ORDER — METOPROLOL SUCCINATE 25 MG/1
25 TABLET, EXTENDED RELEASE ORAL DAILY
Qty: 90 TABLET | Refills: 1 | Status: SHIPPED | OUTPATIENT
Start: 2018-08-16 | End: 2019-01-14 | Stop reason: SDUPTHER

## 2018-08-16 NOTE — PROGRESS NOTES
Date of Office Visit: 2018  Encounter Provider: Duke Castillo MD  Place of Service: [facility]  Patient Name: Barbara Segovia  :1945      Chief Complaint   Patient presents with   • Complete Heart Block     History of Present Illness    The patient is a 72-year-old white female who is here in the office today post permanent pacemaker implant complete heart block.  Her device is working appropriately.  We have noted a slight rise in her atrial impedance but still well within acceptable levels and capture appears to be normal.  The patient feels better without any lightheadedness or dizziness.  She does have chronic shortness of breath and fatigue issues from her underlying rheumatoid arthritis.  She did have an echocardiogram one hospitalized with normal left ventricular systolic function.    Past Medical History:   Diagnosis Date   • Arthritis     rheumatoid arthritis   • Asthma    • Cancer (CMS/HCC)     basal cell skin cancer   • COPD (chronic obstructive pulmonary disease) (CMS/HCC)    • Disease of thyroid gland    • Elevated cholesterol    • GERD (gastroesophageal reflux disease)    • History of transfusion    • Hypertension    • Third degree heart block (CMS/HCC)          Past Surgical History:   Procedure Laterality Date   • CARDIAC ELECTROPHYSIOLOGY PROCEDURE N/A 2018    Procedure: Pacemaker DC new;  Surgeon: Duke Castillo MD;  Location: [facility] INVASIVE LOCATION;  Service: Cardiovascular   • EYE SURGERY     • FOOT SURGERY Right     hammer toe   • HAND SURGERY Right    • HYSTERECTOMY     • JOINT REPLACEMENT     • PACEMAKER IMPLANTATION  2018           Current Outpatient Prescriptions:   •  albuterol (PROVENTIL HFA;VENTOLIN HFA) 108 (90 Base) MCG/ACT inhaler, Inhale 2 puffs Every 4 (Four) Hours As Needed for Wheezing., Disp: , Rfl:   •  aspirin 81 MG EC tablet, Take 81 mg by mouth Every Evening., Disp: , Rfl:   •  benazepril  (LOTENSIN) 20 MG tablet, Take 20 mg by mouth Daily., Disp: , Rfl:   •  calcium carbonate (OS-CANDIDO) 600 MG tablet, Take 900 mg by mouth Every Evening., Disp: , Rfl:   •  fluticasone-salmeterol (ADVAIR) 100-50 MCG/DOSE DISKUS, Inhale 2 (Two) Times a Day As Needed., Disp: , Rfl:   •  folic acid (FOLVITE) 1 MG tablet, Take 1 mg by mouth 2 (Two) Times a Day., Disp: , Rfl:   •  HYDROcodone-acetaminophen (NORCO) 7.5-325 MG per tablet, Take 1 tablet by mouth 2 (Two) Times a Day As Needed for Moderate Pain ., Disp: , Rfl:   •  hydroxychloroquine (PLAQUENIL) 200 MG tablet, Take 200 mg by mouth Every Evening., Disp: , Rfl:   •  levothyroxine (SYNTHROID, LEVOTHROID) 88 MCG tablet, Take 88 mcg by mouth Daily., Disp: , Rfl:   •  methotrexate 2.5 MG tablet, Take 10 mg by mouth 1 (One) Time Per Week. wednesdays, Disp: , Rfl:   •  metoprolol succinate XL (TOPROL-XL) 25 MG 24 hr tablet, Take 1 tablet by mouth Daily., Disp: 30 tablet, Rfl: 1  •  raNITIdine (ZANTAC) 150 MG tablet, Take 150 mg by mouth Every Night., Disp: , Rfl:   •  sertraline (ZOLOFT) 100 MG tablet, Take 100 mg by mouth Daily., Disp: , Rfl:   •  Tofacitinib Citrate (XELJANZ) 5 MG tablet, Take 5 mg by mouth 2 (Two) Times a Day., Disp: , Rfl:   •  vitamin B-12 (CYANOCOBALAMIN) 500 MCG tablet, Take 500 mcg by mouth Daily., Disp: , Rfl:       Social History     Social History   • Marital status:      Spouse name: N/A   • Number of children: N/A   • Years of education: N/A     Occupational History   • Not on file.     Social History Main Topics   • Smoking status: Never Smoker   • Smokeless tobacco: Not on file      Comment: Patient reported that she does not smoke   • Alcohol use No   • Drug use: No   • Sexual activity: Defer     Other Topics Concern   • Not on file     Social History Narrative   • No narrative on file         Review of Systems   Constitution: Positive for malaise/fatigue.   HENT: Negative.    Eyes: Negative.    Cardiovascular: Positive for dyspnea  "on exertion.   Respiratory: Negative.    Endocrine: Negative.    Skin: Negative.    Musculoskeletal: Negative.    Gastrointestinal: Negative.    Neurological: Negative.    Psychiatric/Behavioral: Negative.        Procedures    Procedures        Objective:    /80   Pulse 84   Ht 154.9 cm (61\")   Wt 68.5 kg (151 lb)   BMI 28.53 kg/m²         Physical Exam   Constitutional: She is oriented to person, place, and time. She appears well-developed and well-nourished.   HENT:   Head: Normocephalic.   Eyes: Pupils are equal, round, and reactive to light.   Neck: Normal range of motion. No JVD present. Carotid bruit is not present. No thyromegaly present.   Cardiovascular: Normal rate, regular rhythm, S1 normal, S2 normal, normal heart sounds and intact distal pulses.  Exam reveals no gallop and no friction rub.    No murmur heard.  Pulmonary/Chest: Effort normal and breath sounds normal.   Abdominal: Soft. Bowel sounds are normal.   Musculoskeletal: She exhibits no edema.   Neurological: She is alert and oriented to person, place, and time.   Skin: Skin is warm, dry and intact. No erythema.   Psychiatric: She has a normal mood and affect.   Vitals reviewed.          Assessment:       Diagnosis Plan   1. Third degree heart block (CMS/HCC)     2. Essential hypertension              Plan:       From a cardiac standpoint the patient appears to be stable.  I will see her back in a year.  She will continue to be monitored in our pacemaker clinic  "

## 2018-08-16 NOTE — PROGRESS NOTES
Date of Office Visit: 2018  Encounter Provider: Duke Castillo MD  Place of Service: [facility] C[facility]IOLOG[facility]  Patient Name: Barbara Segovia  :1945      Chief Complaint   Patient presents with   • Complete Heart Block     History of Present Illness        Past Medical History:   Diagnosis Date   • Arthritis     rheumatoid arthritis   • Asthma    • Cancer (CMS/HCC)     basal cell skin cancer   • COPD (chronic obstructive pulmonary disease) (CMS/HCC)    • Disease of thyroid gland    • Elevated cholesterol    • GERD (gastroesophageal reflux disease)    • History of transfusion    • Hypertension    • Third degree heart block (CMS/HCC)          Past Surgical History:   Procedure Laterality Date   • CARDIAC ELECTROPHYSIOLOGY PROCEDURE N/A 2018    Procedure: Pacemaker DC new;  Surgeon: Duke Castillo MD;  Location: [facility] INVASIVE LOCATION;  Service: Cardiovascular   • EYE SURGERY     • FOOT SURGERY Right     hammer toe   • HAND SURGERY Right    • HYSTERECTOMY     • JOINT REPLACEMENT     • PACEMAKER IMPLANTATION  2018           Current Outpatient Prescriptions:   •  albuterol (PROVENTIL HFA;VENTOLIN HFA) 108 (90 Base) MCG/ACT inhaler, Inhale 2 puffs Every 4 (Four) Hours As Needed for Wheezing., Disp: , Rfl:   •  aspirin 81 MG EC tablet, Take 81 mg by mouth Every Evening., Disp: , Rfl:   •  benazepril (LOTENSIN) 20 MG tablet, Take 20 mg by mouth Daily., Disp: , Rfl:   •  calcium carbonate (OS-CANDIDO) 600 MG tablet, Take 900 mg by mouth Every Evening., Disp: , Rfl:   •  fluticasone-salmeterol (ADVAIR) 100-50 MCG/DOSE DISKUS, Inhale 2 (Two) Times a Day As Needed., Disp: , Rfl:   •  folic acid (FOLVITE) 1 MG tablet, Take 1 mg by mouth 2 (Two) Times a Day., Disp: , Rfl:   •  HYDROcodone-acetaminophen (NORCO) 7.5-325 MG per tablet, Take 1 tablet by mouth 2 (Two) Times a Day As Needed for Moderate Pain ., Disp: , Rfl:   •  hydroxychloroquine (PLAQUENIL) 200 MG  "tablet, Take 200 mg by mouth Every Evening., Disp: , Rfl:   •  levothyroxine (SYNTHROID, LEVOTHROID) 88 MCG tablet, Take 88 mcg by mouth Daily., Disp: , Rfl:   •  methotrexate 2.5 MG tablet, Take 10 mg by mouth 1 (One) Time Per Week. wednesdays, Disp: , Rfl:   •  metoprolol succinate XL (TOPROL-XL) 25 MG 24 hr tablet, Take 1 tablet by mouth Daily., Disp: 30 tablet, Rfl: 1  •  raNITIdine (ZANTAC) 150 MG tablet, Take 150 mg by mouth Every Night., Disp: , Rfl:   •  sertraline (ZOLOFT) 100 MG tablet, Take 100 mg by mouth Daily., Disp: , Rfl:   •  Tofacitinib Citrate (XELJANZ) 5 MG tablet, Take 5 mg by mouth 2 (Two) Times a Day., Disp: , Rfl:   •  vitamin B-12 (CYANOCOBALAMIN) 500 MCG tablet, Take 500 mcg by mouth Daily., Disp: , Rfl:       Social History     Social History   • Marital status:      Spouse name: N/A   • Number of children: N/A   • Years of education: N/A     Occupational History   • Not on file.     Social History Main Topics   • Smoking status: Never Smoker   • Smokeless tobacco: Not on file      Comment: Patient reported that she does not smoke   • Alcohol use No   • Drug use: No   • Sexual activity: Defer     Other Topics Concern   • Not on file     Social History Narrative   • No narrative on file         ROS    Procedures    Procedures        Objective:    /80   Pulse 84   Ht 154.9 cm (61\")   Wt 68.5 kg (151 lb)   BMI 28.53 kg/m²         Physical Exam        Assessment:    No diagnosis found.         Plan:         "

## 2018-08-18 ENCOUNTER — CLINICAL SUPPORT NO REQUIREMENTS (OUTPATIENT)
Dept: CARDIOLOGY | Facility: CLINIC | Age: 73
End: 2018-08-18

## 2018-08-18 DIAGNOSIS — I44.2 COMPLETE HEART BLOCK (HCC): Primary | ICD-10-CM

## 2018-10-31 ENCOUNTER — CLINICAL SUPPORT NO REQUIREMENTS (OUTPATIENT)
Dept: CARDIOLOGY | Facility: CLINIC | Age: 73
End: 2018-10-31

## 2018-10-31 DIAGNOSIS — I44.2 COMPLETE HEART BLOCK (HCC): Primary | ICD-10-CM

## 2018-10-31 PROCEDURE — 93280 PM DEVICE PROGR EVAL DUAL: CPT | Performed by: INTERNAL MEDICINE

## 2019-01-14 RX ORDER — METOPROLOL SUCCINATE 25 MG/1
25 TABLET, EXTENDED RELEASE ORAL DAILY
Qty: 90 TABLET | Refills: 1 | Status: SHIPPED | OUTPATIENT
Start: 2019-01-14 | End: 2019-07-16 | Stop reason: SDUPTHER

## 2019-02-04 ENCOUNTER — CLINICAL SUPPORT NO REQUIREMENTS (OUTPATIENT)
Dept: CARDIOLOGY | Facility: CLINIC | Age: 74
End: 2019-02-04

## 2019-02-04 ENCOUNTER — TELEPHONE (OUTPATIENT)
Dept: CARDIOLOGY | Facility: CLINIC | Age: 74
End: 2019-02-04

## 2019-02-04 DIAGNOSIS — I44.2 THIRD DEGREE AV BLOCK (HCC): Primary | ICD-10-CM

## 2019-02-04 PROCEDURE — 93296 REM INTERROG EVL PM/IDS: CPT | Performed by: INTERNAL MEDICINE

## 2019-02-04 PROCEDURE — 93294 REM INTERROG EVL PM/LDLS PM: CPT | Performed by: INTERNAL MEDICINE

## 2019-02-04 NOTE — TELEPHONE ENCOUNTER
Routine pacer check today.  Pt had 9 beats of nst vt last night.  She states she was weak and dizzy the past 2 days, but there were no specific symptoms that correlate to the vt.  She is seeing you next in Aug.

## 2019-05-07 ENCOUNTER — CLINICAL SUPPORT NO REQUIREMENTS (OUTPATIENT)
Dept: CARDIOLOGY | Facility: CLINIC | Age: 74
End: 2019-05-07

## 2019-05-07 ENCOUNTER — TELEPHONE (OUTPATIENT)
Dept: CARDIOLOGY | Facility: CLINIC | Age: 74
End: 2019-05-07

## 2019-05-07 DIAGNOSIS — I42.9 CARDIOMYOPATHY, UNSPECIFIED TYPE (HCC): Primary | ICD-10-CM

## 2019-05-07 DIAGNOSIS — I44.2 ATRIOVENTRICULAR BLOCK, COMPLETE (HCC): ICD-10-CM

## 2019-05-07 DIAGNOSIS — I44.2 COMPLETE HEART BLOCK (HCC): Primary | ICD-10-CM

## 2019-05-07 PROCEDURE — 93280 PM DEVICE PROGR EVAL DUAL: CPT | Performed by: INTERNAL MEDICINE

## 2019-05-07 NOTE — TELEPHONE ENCOUNTER
Patient in clinic for check on her device. Patient has complaints of SOA, feeling fatigued, and weight gain of 5 lbs within the last month. She states that all this is new for her and she states that 2 weeks ago she fell. I spoke with Dr. Castillo, who will order an echo for patient.

## 2019-05-20 ENCOUNTER — HOSPITAL ENCOUNTER (OUTPATIENT)
Dept: CARDIOLOGY | Facility: HOSPITAL | Age: 74
Discharge: HOME | End: 2019-05-20
Admitting: INTERNAL MEDICINE

## 2019-05-20 ENCOUNTER — CLINICAL SUPPORT NO REQUIREMENTS (OUTPATIENT)
Dept: CARDIOLOGY | Facility: CLINIC | Age: 74
End: 2019-05-20

## 2019-05-20 VITALS
HEIGHT: 61 IN | DIASTOLIC BLOOD PRESSURE: 67 MMHG | SYSTOLIC BLOOD PRESSURE: 136 MMHG | BODY MASS INDEX: 28.51 KG/M2 | WEIGHT: 151 LBS | HEART RATE: 72 BPM

## 2019-05-20 DIAGNOSIS — I44.2 ATRIOVENTRICULAR BLOCK, COMPLETE (HCC): ICD-10-CM

## 2019-05-20 DIAGNOSIS — I42.9 CARDIOMYOPATHY, UNSPECIFIED TYPE (HCC): ICD-10-CM

## 2019-05-20 DIAGNOSIS — I44.2 COMPLETE HEART BLOCK (HCC): Primary | ICD-10-CM

## 2019-05-20 LAB
AORTIC ROOT ANNULUS: 2.6 CM
ASCENDING AORTA: 3.1 CM
BH CV ECHO MEAS - ACS: 2.3 CM
BH CV ECHO MEAS - AO MAX PG (FULL): 3.9 MMHG
BH CV ECHO MEAS - AO MAX PG: 6.3 MMHG
BH CV ECHO MEAS - AO MEAN PG (FULL): 2.3 MMHG
BH CV ECHO MEAS - AO MEAN PG: 3.7 MMHG
BH CV ECHO MEAS - AO V2 MAX: 126 CM/SEC
BH CV ECHO MEAS - AO V2 MEAN: 93 CM/SEC
BH CV ECHO MEAS - AO V2 VTI: 25.9 CM
BH CV ECHO MEAS - AVA(I,A): 1.9 CM^2
BH CV ECHO MEAS - AVA(I,D): 1.9 CM^2
BH CV ECHO MEAS - AVA(V,A): 2 CM^2
BH CV ECHO MEAS - AVA(V,D): 2 CM^2
BH CV ECHO MEAS - BSA(HAYCOCK): 1.7 M^2
BH CV ECHO MEAS - BSA: 1.7 M^2
BH CV ECHO MEAS - BZI_BMI: 28.5 KILOGRAMS/M^2
BH CV ECHO MEAS - BZI_METRIC_HEIGHT: 154.9 CM
BH CV ECHO MEAS - BZI_METRIC_WEIGHT: 68.5 KG
BH CV ECHO MEAS - EDV(MOD-SP2): 91 ML
BH CV ECHO MEAS - EDV(MOD-SP4): 96 ML
BH CV ECHO MEAS - EDV(TEICH): 85.5 ML
BH CV ECHO MEAS - EF(CUBED): 61.3 %
BH CV ECHO MEAS - EF(MOD-BP): 53 %
BH CV ECHO MEAS - EF(MOD-SP2): 50.5 %
BH CV ECHO MEAS - EF(MOD-SP4): 55.2 %
BH CV ECHO MEAS - EF(TEICH): 53.1 %
BH CV ECHO MEAS - ESV(MOD-SP2): 45 ML
BH CV ECHO MEAS - ESV(MOD-SP4): 43 ML
BH CV ECHO MEAS - ESV(TEICH): 40.1 ML
BH CV ECHO MEAS - FS: 27.1 %
BH CV ECHO MEAS - IVS/LVPW: 1
BH CV ECHO MEAS - IVSD: 1.1 CM
BH CV ECHO MEAS - LAT PEAK E' VEL: 6 CM/SEC
BH CV ECHO MEAS - LV DIASTOLIC VOL/BSA (35-75): 57.3 ML/M^2
BH CV ECHO MEAS - LV MASS(C)D: 170.2 GRAMS
BH CV ECHO MEAS - LV MASS(C)DI: 101.5 GRAMS/M^2
BH CV ECHO MEAS - LV MAX PG: 2.5 MMHG
BH CV ECHO MEAS - LV MEAN PG: 1.4 MMHG
BH CV ECHO MEAS - LV SYSTOLIC VOL/BSA (12-30): 25.6 ML/M^2
BH CV ECHO MEAS - LV V1 MAX: 78.6 CM/SEC
BH CV ECHO MEAS - LV V1 MEAN: 56.3 CM/SEC
BH CV ECHO MEAS - LV V1 VTI: 15.3 CM
BH CV ECHO MEAS - LVIDD: 4.4 CM
BH CV ECHO MEAS - LVIDS: 3.2 CM
BH CV ECHO MEAS - LVLD AP2: 7.3 CM
BH CV ECHO MEAS - LVLD AP4: 7.4 CM
BH CV ECHO MEAS - LVLS AP2: 6.9 CM
BH CV ECHO MEAS - LVLS AP4: 6.8 CM
BH CV ECHO MEAS - LVOT AREA (M): 3.1 CM^2
BH CV ECHO MEAS - LVOT AREA: 3.2 CM^2
BH CV ECHO MEAS - LVOT DIAM: 2 CM
BH CV ECHO MEAS - LVPWD: 1.1 CM
BH CV ECHO MEAS - MED PEAK E' VEL: 6 CM/SEC
BH CV ECHO MEAS - MR MAX PG: 9.9 MMHG
BH CV ECHO MEAS - MR MAX VEL: 157.2 CM/SEC
BH CV ECHO MEAS - MV A DUR: 0.13 SEC
BH CV ECHO MEAS - MV A MAX VEL: 88.3 CM/SEC
BH CV ECHO MEAS - MV DEC SLOPE: 205.8 CM/SEC^2
BH CV ECHO MEAS - MV DEC TIME: 0.28 SEC
BH CV ECHO MEAS - MV E MAX VEL: 60.6 CM/SEC
BH CV ECHO MEAS - MV E/A: 0.69
BH CV ECHO MEAS - MV MAX PG: 2.8 MMHG
BH CV ECHO MEAS - MV MEAN PG: 1.3 MMHG
BH CV ECHO MEAS - MV P1/2T MAX VEL: 61.6 CM/SEC
BH CV ECHO MEAS - MV P1/2T: 87.7 MSEC
BH CV ECHO MEAS - MV V2 MAX: 84.3 CM/SEC
BH CV ECHO MEAS - MV V2 MEAN: 54.5 CM/SEC
BH CV ECHO MEAS - MV V2 VTI: 26.5 CM
BH CV ECHO MEAS - MVA P1/2T LCG: 3.6 CM^2
BH CV ECHO MEAS - MVA(P1/2T): 2.5 CM^2
BH CV ECHO MEAS - MVA(VTI): 1.9 CM^2
BH CV ECHO MEAS - PA ACC TIME: 0.09 SEC
BH CV ECHO MEAS - PA MAX PG (FULL): 0 MMHG
BH CV ECHO MEAS - PA MAX PG: 1.8 MMHG
BH CV ECHO MEAS - PA PR(ACCEL): 37.8 MMHG
BH CV ECHO MEAS - PA V2 MAX: 66.9 CM/SEC
BH CV ECHO MEAS - PULM A REVS DUR: 0.1 SEC
BH CV ECHO MEAS - PULM A REVS VEL: 23.3 CM/SEC
BH CV ECHO MEAS - PULM DIAS VEL: 36.4 CM/SEC
BH CV ECHO MEAS - PULM S/D: 1.1
BH CV ECHO MEAS - PULM SYS VEL: 41.7 CM/SEC
BH CV ECHO MEAS - PVA(V,A): 3.1 CM^2
BH CV ECHO MEAS - PVA(V,D): 3.1 CM^2
BH CV ECHO MEAS - QP/QS: 0.93
BH CV ECHO MEAS - RAP SYSTOLE: 3 MMHG
BH CV ECHO MEAS - RV MAX PG: 1.8 MMHG
BH CV ECHO MEAS - RV MEAN PG: 0.79 MMHG
BH CV ECHO MEAS - RV V1 MAX: 66.9 CM/SEC
BH CV ECHO MEAS - RV V1 MEAN: 41.8 CM/SEC
BH CV ECHO MEAS - RV V1 VTI: 14.7 CM
BH CV ECHO MEAS - RVOT AREA: 3.1 CM^2
BH CV ECHO MEAS - RVOT DIAM: 2 CM
BH CV ECHO MEAS - RVSP: 25.5 MMHG
BH CV ECHO MEAS - SI(CUBED): 30.1 ML/M^2
BH CV ECHO MEAS - SI(LVOT): 29.4 ML/M^2
BH CV ECHO MEAS - SI(MOD-SP2): 27.4 ML/M^2
BH CV ECHO MEAS - SI(MOD-SP4): 31.6 ML/M^2
BH CV ECHO MEAS - SI(TEICH): 27.1 ML/M^2
BH CV ECHO MEAS - SUP REN AO DIAM: 1.6 CM
BH CV ECHO MEAS - SV(CUBED): 50.5 ML
BH CV ECHO MEAS - SV(LVOT): 49.2 ML
BH CV ECHO MEAS - SV(MOD-SP2): 46 ML
BH CV ECHO MEAS - SV(MOD-SP4): 53 ML
BH CV ECHO MEAS - SV(RVOT): 45.7 ML
BH CV ECHO MEAS - SV(TEICH): 45.4 ML
BH CV ECHO MEAS - TAPSE (>1.6): 2.4 CM2
BH CV ECHO MEAS - TR MAX VEL: 237.2 CM/SEC
BH CV ECHO MEASUREMENTS AVERAGE E/E' RATIO: 10.1
BH CV XLRA - RV BASE: 2.3 CM
BH CV XLRA - TDI S': 13 CM/SEC
LEFT ATRIUM VOLUME INDEX: 22 ML/M2
LV EF 2D ECHO EST: 53 %
MAXIMAL PREDICTED HEART RATE: 148 BPM
SINUS: 2.8 CM
STJ: 2.8 CM
STRESS TARGET HR: 126 BPM

## 2019-05-20 PROCEDURE — 93306 TTE W/DOPPLER COMPLETE: CPT

## 2019-05-20 PROCEDURE — 93306 TTE W/DOPPLER COMPLETE: CPT | Performed by: INTERNAL MEDICINE

## 2019-05-31 ENCOUNTER — OFFICE (AMBULATORY)
Dept: URBAN - METROPOLITAN AREA CLINIC 1 | Facility: CLINIC | Age: 74
End: 2019-05-31

## 2019-05-31 VITALS
HEIGHT: 61 IN | RESPIRATION RATE: 16 BRPM | HEART RATE: 62 BPM | WEIGHT: 158 LBS | DIASTOLIC BLOOD PRESSURE: 82 MMHG | SYSTOLIC BLOOD PRESSURE: 128 MMHG

## 2019-05-31 DIAGNOSIS — K21.9 GASTRO-ESOPHAGEAL REFLUX DISEASE WITHOUT ESOPHAGITIS: ICD-10-CM

## 2019-05-31 DIAGNOSIS — R11.2 NAUSEA WITH VOMITING, UNSPECIFIED: ICD-10-CM

## 2019-05-31 DIAGNOSIS — R13.12 DYSPHAGIA, OROPHARYNGEAL PHASE: ICD-10-CM

## 2019-05-31 DIAGNOSIS — K29.50 UNSPECIFIED CHRONIC GASTRITIS WITHOUT BLEEDING: ICD-10-CM

## 2019-05-31 DIAGNOSIS — R10.13 EPIGASTRIC PAIN: ICD-10-CM

## 2019-05-31 DIAGNOSIS — K30 FUNCTIONAL DYSPEPSIA: ICD-10-CM

## 2019-05-31 DIAGNOSIS — K52.9 NONINFECTIVE GASTROENTERITIS AND COLITIS, UNSPECIFIED: ICD-10-CM

## 2019-05-31 DIAGNOSIS — Z91.89 OTHER SPECIFIED PERSONAL RISK FACTORS, NOT ELSEWHERE CLASSIF: ICD-10-CM

## 2019-05-31 DIAGNOSIS — R19.7 DIARRHEA, UNSPECIFIED: ICD-10-CM

## 2019-05-31 DIAGNOSIS — R10.12 LEFT UPPER QUADRANT PAIN: ICD-10-CM

## 2019-05-31 PROCEDURE — 99214 OFFICE O/P EST MOD 30 MIN: CPT | Performed by: INTERNAL MEDICINE

## 2019-05-31 RX ORDER — PANTOPRAZOLE 40 MG/1
TABLET, DELAYED RELEASE ORAL
Qty: 90 | Refills: 4 | Status: ACTIVE

## 2019-06-07 ENCOUNTER — HOSPITAL ENCOUNTER (OUTPATIENT)
Facility: HOSPITAL | Age: 74
LOS: 1 days | Discharge: HOME | End: 2019-06-09
Attending: HOSPITALIST | Admitting: ORTHOPAEDIC SURGERY

## 2019-06-07 DIAGNOSIS — R26.2 DIFFICULTY WALKING: Primary | ICD-10-CM

## 2019-06-07 PROBLEM — S73.004A DISLOCATED HIP, RIGHT, INITIAL ENCOUNTER (HCC): Status: ACTIVE | Noted: 2019-06-07

## 2019-06-07 PROCEDURE — 96361 HYDRATE IV INFUSION ADD-ON: CPT

## 2019-06-07 RX ORDER — HYDROMORPHONE HYDROCHLORIDE 1 MG/ML
0.5 INJECTION, SOLUTION INTRAMUSCULAR; INTRAVENOUS; SUBCUTANEOUS
Status: DISCONTINUED | OUTPATIENT
Start: 2019-06-07 | End: 2019-06-09 | Stop reason: HOSPADM

## 2019-06-07 RX ORDER — LEVOTHYROXINE SODIUM 0.07 MG/1
75 TABLET ORAL
Status: DISCONTINUED | OUTPATIENT
Start: 2019-06-08 | End: 2019-06-09 | Stop reason: HOSPADM

## 2019-06-07 RX ORDER — ACETAMINOPHEN 325 MG/1
650 TABLET ORAL EVERY 4 HOURS PRN
Status: DISCONTINUED | OUTPATIENT
Start: 2019-06-07 | End: 2019-06-09 | Stop reason: HOSPADM

## 2019-06-07 RX ORDER — PANTOPRAZOLE SODIUM 40 MG/1
40 TABLET, DELAYED RELEASE ORAL DAILY
COMMUNITY

## 2019-06-07 RX ORDER — ERGOCALCIFEROL 1.25 MG/1
50000 CAPSULE ORAL
Status: DISCONTINUED | OUTPATIENT
Start: 2019-06-08 | End: 2019-06-09 | Stop reason: HOSPADM

## 2019-06-07 RX ORDER — FOLIC ACID 1 MG/1
1 TABLET ORAL DAILY
Status: DISCONTINUED | OUTPATIENT
Start: 2019-06-08 | End: 2019-06-09 | Stop reason: HOSPADM

## 2019-06-07 RX ORDER — CHOLECALCIFEROL (VITAMIN D3) 125 MCG
500 CAPSULE ORAL 3 TIMES WEEKLY
COMMUNITY
End: 2019-08-22

## 2019-06-07 RX ORDER — SODIUM CHLORIDE 0.9 % (FLUSH) 0.9 %
3-10 SYRINGE (ML) INJECTION AS NEEDED
Status: DISCONTINUED | OUTPATIENT
Start: 2019-06-07 | End: 2019-06-09 | Stop reason: HOSPADM

## 2019-06-07 RX ORDER — NALOXONE HCL 0.4 MG/ML
0.4 VIAL (ML) INJECTION
Status: DISCONTINUED | OUTPATIENT
Start: 2019-06-07 | End: 2019-06-09 | Stop reason: HOSPADM

## 2019-06-07 RX ORDER — METOPROLOL SUCCINATE 25 MG/1
25 TABLET, EXTENDED RELEASE ORAL DAILY
Status: DISCONTINUED | OUTPATIENT
Start: 2019-06-08 | End: 2019-06-09 | Stop reason: HOSPADM

## 2019-06-07 RX ORDER — HYDROXYCHLOROQUINE SULFATE 200 MG/1
200 TABLET, FILM COATED ORAL EVERY EVENING
Status: DISCONTINUED | OUTPATIENT
Start: 2019-06-08 | End: 2019-06-09 | Stop reason: HOSPADM

## 2019-06-07 RX ORDER — LEVOTHYROXINE SODIUM 0.07 MG/1
75 TABLET ORAL DAILY
COMMUNITY

## 2019-06-07 RX ORDER — HYDROCODONE BITARTRATE AND ACETAMINOPHEN 7.5; 325 MG/1; MG/1
1 TABLET ORAL 2 TIMES DAILY PRN
Status: DISCONTINUED | OUTPATIENT
Start: 2019-06-07 | End: 2019-06-08

## 2019-06-07 RX ORDER — CHOLECALCIFEROL (VITAMIN D3) 125 MCG
500 CAPSULE ORAL 3 TIMES WEEKLY
Status: DISCONTINUED | OUTPATIENT
Start: 2019-06-10 | End: 2019-06-09 | Stop reason: HOSPADM

## 2019-06-07 RX ORDER — MORPHINE SULFATE 2 MG/ML
1 INJECTION, SOLUTION INTRAMUSCULAR; INTRAVENOUS EVERY 4 HOURS PRN
Status: DISCONTINUED | OUTPATIENT
Start: 2019-06-07 | End: 2019-06-09 | Stop reason: HOSPADM

## 2019-06-07 RX ORDER — SODIUM CHLORIDE 0.9 % (FLUSH) 0.9 %
3 SYRINGE (ML) INJECTION EVERY 12 HOURS SCHEDULED
Status: DISCONTINUED | OUTPATIENT
Start: 2019-06-07 | End: 2019-06-09 | Stop reason: HOSPADM

## 2019-06-07 RX ORDER — ONDANSETRON 2 MG/ML
4 INJECTION INTRAMUSCULAR; INTRAVENOUS EVERY 6 HOURS PRN
Status: DISCONTINUED | OUTPATIENT
Start: 2019-06-07 | End: 2019-06-09 | Stop reason: HOSPADM

## 2019-06-07 RX ORDER — IBUPROFEN 200 MG
625 CAPSULE ORAL 2 TIMES DAILY WITH MEALS
Status: DISCONTINUED | OUTPATIENT
Start: 2019-06-08 | End: 2019-06-09 | Stop reason: HOSPADM

## 2019-06-07 RX ORDER — SODIUM CHLORIDE 9 MG/ML
100 INJECTION, SOLUTION INTRAVENOUS CONTINUOUS
Status: DISCONTINUED | OUTPATIENT
Start: 2019-06-07 | End: 2019-06-09 | Stop reason: HOSPADM

## 2019-06-07 RX ORDER — BUDESONIDE AND FORMOTEROL FUMARATE DIHYDRATE 80; 4.5 UG/1; UG/1
2 AEROSOL RESPIRATORY (INHALATION)
Status: DISCONTINUED | OUTPATIENT
Start: 2019-06-07 | End: 2019-06-09 | Stop reason: HOSPADM

## 2019-06-07 RX ORDER — SERTRALINE HYDROCHLORIDE 100 MG/1
100 TABLET, FILM COATED ORAL DAILY
Status: DISCONTINUED | OUTPATIENT
Start: 2019-06-08 | End: 2019-06-09 | Stop reason: HOSPADM

## 2019-06-07 RX ORDER — ALBUTEROL SULFATE 2.5 MG/3ML
2.5 SOLUTION RESPIRATORY (INHALATION) EVERY 6 HOURS PRN
Status: DISCONTINUED | OUTPATIENT
Start: 2019-06-07 | End: 2019-06-09 | Stop reason: HOSPADM

## 2019-06-07 RX ORDER — ERGOCALCIFEROL 1.25 MG/1
50000 CAPSULE ORAL
COMMUNITY

## 2019-06-07 RX ORDER — PANTOPRAZOLE SODIUM 40 MG/1
40 TABLET, DELAYED RELEASE ORAL DAILY
Status: DISCONTINUED | OUTPATIENT
Start: 2019-06-08 | End: 2019-06-09 | Stop reason: HOSPADM

## 2019-06-07 RX ADMIN — HYDROCODONE BITARTRATE AND ACETAMINOPHEN 1 TABLET: 7.5; 325 TABLET ORAL at 23:04

## 2019-06-07 RX ADMIN — SODIUM CHLORIDE 100 ML/HR: 9 INJECTION, SOLUTION INTRAVENOUS at 23:00

## 2019-06-08 ENCOUNTER — ANESTHESIA (OUTPATIENT)
Dept: PERIOP | Facility: HOSPITAL | Age: 74
End: 2019-06-08

## 2019-06-08 ENCOUNTER — APPOINTMENT (OUTPATIENT)
Dept: GENERAL RADIOLOGY | Facility: HOSPITAL | Age: 74
End: 2019-06-08

## 2019-06-08 ENCOUNTER — ANESTHESIA EVENT (OUTPATIENT)
Dept: PERIOP | Facility: HOSPITAL | Age: 74
End: 2019-06-08

## 2019-06-08 PROBLEM — S73.004A DISLOCATED HIP, RIGHT, INITIAL ENCOUNTER (HCC): Status: RESOLVED | Noted: 2019-06-07 | Resolved: 2019-06-08

## 2019-06-08 PROBLEM — S73.005A DISLOCATED HIP, LEFT, INITIAL ENCOUNTER (HCC): Status: ACTIVE | Noted: 2019-06-08

## 2019-06-08 LAB
ALBUMIN SERPL-MCNC: 3.8 G/DL (ref 3.5–5.2)
ALBUMIN/GLOB SERPL: 1.3 G/DL
ALP SERPL-CCNC: 74 U/L (ref 39–117)
ALT SERPL W P-5'-P-CCNC: 14 U/L (ref 1–33)
ANION GAP SERPL CALCULATED.3IONS-SCNC: 11 MMOL/L
AST SERPL-CCNC: 25 U/L (ref 1–32)
BASOPHILS # BLD AUTO: 0.02 10*3/MM3 (ref 0–0.2)
BASOPHILS NFR BLD AUTO: 0.3 % (ref 0–1.5)
BILIRUB SERPL-MCNC: 0.4 MG/DL (ref 0.2–1.2)
BUN BLD-MCNC: 17 MG/DL (ref 8–23)
BUN/CREAT SERPL: 19.5 (ref 7–25)
CALCIUM SPEC-SCNC: 9.2 MG/DL (ref 8.6–10.5)
CHLORIDE SERPL-SCNC: 103 MMOL/L (ref 98–107)
CO2 SERPL-SCNC: 23 MMOL/L (ref 22–29)
CREAT BLD-MCNC: 0.87 MG/DL (ref 0.57–1)
DEPRECATED RDW RBC AUTO: 46 FL (ref 37–54)
EOSINOPHIL # BLD AUTO: 0 10*3/MM3 (ref 0–0.4)
EOSINOPHIL NFR BLD AUTO: 0 % (ref 0.3–6.2)
ERYTHROCYTE [DISTWIDTH] IN BLOOD BY AUTOMATED COUNT: 12.3 % (ref 12.3–15.4)
GFR SERPL CREATININE-BSD FRML MDRD: 64 ML/MIN/1.73
GLOBULIN UR ELPH-MCNC: 2.9 GM/DL
GLUCOSE BLD-MCNC: 108 MG/DL (ref 65–99)
HBA1C MFR BLD: 5.1 % (ref 4.8–5.6)
HCT VFR BLD AUTO: 35.4 % (ref 34–46.6)
HGB BLD-MCNC: 11.3 G/DL (ref 12–15.9)
IMM GRANULOCYTES # BLD AUTO: 0.04 10*3/MM3 (ref 0–0.05)
IMM GRANULOCYTES NFR BLD AUTO: 0.5 % (ref 0–0.5)
LYMPHOCYTES # BLD AUTO: 0.3 10*3/MM3 (ref 0.7–3.1)
LYMPHOCYTES NFR BLD AUTO: 3.9 % (ref 19.6–45.3)
MCH RBC QN AUTO: 32.1 PG (ref 26.6–33)
MCHC RBC AUTO-ENTMCNC: 31.9 G/DL (ref 31.5–35.7)
MCV RBC AUTO: 100.6 FL (ref 79–97)
MONOCYTES # BLD AUTO: 0.62 10*3/MM3 (ref 0.1–0.9)
MONOCYTES NFR BLD AUTO: 8 % (ref 5–12)
NEUTROPHILS # BLD AUTO: 6.74 10*3/MM3 (ref 1.7–7)
NEUTROPHILS NFR BLD AUTO: 87.3 % (ref 42.7–76)
NRBC BLD AUTO-RTO: 0 /100 WBC (ref 0–0.2)
PLATELET # BLD AUTO: 179 10*3/MM3 (ref 140–450)
PMV BLD AUTO: 9.5 FL (ref 6–12)
POTASSIUM BLD-SCNC: 4.4 MMOL/L (ref 3.5–5.2)
PROT SERPL-MCNC: 6.7 G/DL (ref 6–8.5)
RBC # BLD AUTO: 3.52 10*6/MM3 (ref 3.77–5.28)
SODIUM BLD-SCNC: 137 MMOL/L (ref 136–145)
TROPONIN T SERPL-MCNC: <0.01 NG/ML (ref 0–0.03)
WBC NRBC COR # BLD: 7.72 10*3/MM3 (ref 3.4–10.8)

## 2019-06-08 PROCEDURE — A9270 NON-COVERED ITEM OR SERVICE: HCPCS | Performed by: INTERNAL MEDICINE

## 2019-06-08 PROCEDURE — 94799 UNLISTED PULMONARY SVC/PX: CPT

## 2019-06-08 PROCEDURE — 25010000002 PROPOFOL 10 MG/ML EMULSION: Performed by: NURSE ANESTHETIST, CERTIFIED REGISTERED

## 2019-06-08 PROCEDURE — 96361 HYDRATE IV INFUSION ADD-ON: CPT

## 2019-06-08 PROCEDURE — 63710000001 BUDESONIDE-FORMOTEROL 80-4.5 MCG/ACT AEROSOL 6.9 G INHALER: Performed by: INTERNAL MEDICINE

## 2019-06-08 PROCEDURE — 25010000002 FENTANYL CITRATE (PF) 100 MCG/2ML SOLUTION: Performed by: NURSE ANESTHETIST, CERTIFIED REGISTERED

## 2019-06-08 PROCEDURE — 83036 HEMOGLOBIN GLYCOSYLATED A1C: CPT | Performed by: INTERNAL MEDICINE

## 2019-06-08 PROCEDURE — 73502 X-RAY EXAM HIP UNI 2-3 VIEWS: CPT

## 2019-06-08 PROCEDURE — 63710000001 HYDROXYCHLOROQUINE 200 MG TABLET: Performed by: INTERNAL MEDICINE

## 2019-06-08 PROCEDURE — 63710000001: Performed by: INTERNAL MEDICINE

## 2019-06-08 PROCEDURE — 25010000002 FENTANYL CITRATE (PF) 100 MCG/2ML SOLUTION

## 2019-06-08 PROCEDURE — 76000 FLUOROSCOPY <1 HR PHYS/QHP: CPT

## 2019-06-08 PROCEDURE — 63710000001 HYDROCODONE-ACETAMINOPHEN 7.5-325 MG TABLET: Performed by: INTERNAL MEDICINE

## 2019-06-08 PROCEDURE — 63710000001 SERTRALINE 100 MG TABLET: Performed by: INTERNAL MEDICINE

## 2019-06-08 PROCEDURE — 63710000001 FOLIC ACID 1 MG TABLET: Performed by: INTERNAL MEDICINE

## 2019-06-08 PROCEDURE — 73501 X-RAY EXAM HIP UNI 1 VIEW: CPT

## 2019-06-08 PROCEDURE — 80053 COMPREHEN METABOLIC PANEL: CPT | Performed by: INTERNAL MEDICINE

## 2019-06-08 PROCEDURE — 84484 ASSAY OF TROPONIN QUANT: CPT | Performed by: INTERNAL MEDICINE

## 2019-06-08 PROCEDURE — 25010000002 FENTANYL CITRATE (PF) 100 MCG/2ML SOLUTION: Performed by: ANESTHESIOLOGY

## 2019-06-08 PROCEDURE — 25010000002 MIDAZOLAM PER 1 MG: Performed by: ANESTHESIOLOGY

## 2019-06-08 PROCEDURE — 85025 COMPLETE CBC W/AUTO DIFF WBC: CPT | Performed by: INTERNAL MEDICINE

## 2019-06-08 PROCEDURE — 25010000002 HYDROMORPHONE PER 4 MG: Performed by: INTERNAL MEDICINE

## 2019-06-08 PROCEDURE — 94760 N-INVAS EAR/PLS OXIMETRY 1: CPT

## 2019-06-08 PROCEDURE — 96374 THER/PROPH/DIAG INJ IV PUSH: CPT

## 2019-06-08 PROCEDURE — G0378 HOSPITAL OBSERVATION PER HR: HCPCS

## 2019-06-08 RX ORDER — EPHEDRINE SULFATE 50 MG/ML
5 INJECTION, SOLUTION INTRAVENOUS ONCE AS NEEDED
Status: DISCONTINUED | OUTPATIENT
Start: 2019-06-08 | End: 2019-06-08 | Stop reason: HOSPADM

## 2019-06-08 RX ORDER — PROPOFOL 10 MG/ML
VIAL (ML) INTRAVENOUS AS NEEDED
Status: DISCONTINUED | OUTPATIENT
Start: 2019-06-08 | End: 2019-06-08 | Stop reason: SURG

## 2019-06-08 RX ORDER — MIDAZOLAM HYDROCHLORIDE 1 MG/ML
1 INJECTION INTRAMUSCULAR; INTRAVENOUS
Status: DISCONTINUED | OUTPATIENT
Start: 2019-06-08 | End: 2019-06-08 | Stop reason: HOSPADM

## 2019-06-08 RX ORDER — FENTANYL CITRATE 50 UG/ML
50 INJECTION, SOLUTION INTRAMUSCULAR; INTRAVENOUS
Status: DISCONTINUED | OUTPATIENT
Start: 2019-06-08 | End: 2019-06-08 | Stop reason: HOSPADM

## 2019-06-08 RX ORDER — DIPHENHYDRAMINE HCL 25 MG
25 CAPSULE ORAL
Status: DISCONTINUED | OUTPATIENT
Start: 2019-06-08 | End: 2019-06-08 | Stop reason: HOSPADM

## 2019-06-08 RX ORDER — PROMETHAZINE HYDROCHLORIDE 25 MG/1
25 TABLET ORAL ONCE AS NEEDED
Status: DISCONTINUED | OUTPATIENT
Start: 2019-06-08 | End: 2019-06-08 | Stop reason: HOSPADM

## 2019-06-08 RX ORDER — ONDANSETRON 2 MG/ML
4 INJECTION INTRAMUSCULAR; INTRAVENOUS ONCE AS NEEDED
Status: DISCONTINUED | OUTPATIENT
Start: 2019-06-08 | End: 2019-06-08 | Stop reason: HOSPADM

## 2019-06-08 RX ORDER — ACETAMINOPHEN 325 MG/1
650 TABLET ORAL ONCE AS NEEDED
Status: DISCONTINUED | OUTPATIENT
Start: 2019-06-08 | End: 2019-06-08 | Stop reason: HOSPADM

## 2019-06-08 RX ORDER — ASPIRIN 325 MG
325 TABLET, DELAYED RELEASE (ENTERIC COATED) ORAL DAILY
Status: DISCONTINUED | OUTPATIENT
Start: 2019-06-09 | End: 2019-06-09 | Stop reason: HOSPADM

## 2019-06-08 RX ORDER — FAMOTIDINE 10 MG/ML
20 INJECTION, SOLUTION INTRAVENOUS ONCE
Status: COMPLETED | OUTPATIENT
Start: 2019-06-08 | End: 2019-06-08

## 2019-06-08 RX ORDER — LIDOCAINE HYDROCHLORIDE 20 MG/ML
INJECTION, SOLUTION INFILTRATION; PERINEURAL AS NEEDED
Status: DISCONTINUED | OUTPATIENT
Start: 2019-06-08 | End: 2019-06-08 | Stop reason: SURG

## 2019-06-08 RX ORDER — PROMETHAZINE HYDROCHLORIDE 25 MG/1
25 SUPPOSITORY RECTAL ONCE AS NEEDED
Status: DISCONTINUED | OUTPATIENT
Start: 2019-06-08 | End: 2019-06-08 | Stop reason: HOSPADM

## 2019-06-08 RX ORDER — SODIUM CHLORIDE 0.9 % (FLUSH) 0.9 %
1-10 SYRINGE (ML) INJECTION AS NEEDED
Status: DISCONTINUED | OUTPATIENT
Start: 2019-06-08 | End: 2019-06-08 | Stop reason: HOSPADM

## 2019-06-08 RX ORDER — PROMETHAZINE HYDROCHLORIDE 25 MG/ML
6.25 INJECTION, SOLUTION INTRAMUSCULAR; INTRAVENOUS
Status: DISCONTINUED | OUTPATIENT
Start: 2019-06-08 | End: 2019-06-08 | Stop reason: HOSPADM

## 2019-06-08 RX ORDER — HYDRALAZINE HYDROCHLORIDE 20 MG/ML
5 INJECTION INTRAMUSCULAR; INTRAVENOUS
Status: DISCONTINUED | OUTPATIENT
Start: 2019-06-08 | End: 2019-06-08 | Stop reason: HOSPADM

## 2019-06-08 RX ORDER — HYDROMORPHONE HYDROCHLORIDE 1 MG/ML
0.5 INJECTION, SOLUTION INTRAMUSCULAR; INTRAVENOUS; SUBCUTANEOUS
Status: DISCONTINUED | OUTPATIENT
Start: 2019-06-08 | End: 2019-06-08 | Stop reason: HOSPADM

## 2019-06-08 RX ORDER — LABETALOL HYDROCHLORIDE 5 MG/ML
5 INJECTION, SOLUTION INTRAVENOUS
Status: DISCONTINUED | OUTPATIENT
Start: 2019-06-08 | End: 2019-06-08 | Stop reason: HOSPADM

## 2019-06-08 RX ORDER — EPHEDRINE SULFATE 50 MG/ML
INJECTION, SOLUTION INTRAVENOUS AS NEEDED
Status: DISCONTINUED | OUTPATIENT
Start: 2019-06-08 | End: 2019-06-08

## 2019-06-08 RX ORDER — HYDROCODONE BITARTRATE AND ACETAMINOPHEN 7.5; 325 MG/1; MG/1
1 TABLET ORAL ONCE AS NEEDED
Status: DISCONTINUED | OUTPATIENT
Start: 2019-06-08 | End: 2019-06-08 | Stop reason: HOSPADM

## 2019-06-08 RX ORDER — PROMETHAZINE HYDROCHLORIDE 25 MG/ML
12.5 INJECTION, SOLUTION INTRAMUSCULAR; INTRAVENOUS ONCE AS NEEDED
Status: DISCONTINUED | OUTPATIENT
Start: 2019-06-08 | End: 2019-06-08 | Stop reason: HOSPADM

## 2019-06-08 RX ORDER — MIDAZOLAM HYDROCHLORIDE 1 MG/ML
2 INJECTION INTRAMUSCULAR; INTRAVENOUS
Status: DISCONTINUED | OUTPATIENT
Start: 2019-06-08 | End: 2019-06-08 | Stop reason: HOSPADM

## 2019-06-08 RX ORDER — FENTANYL CITRATE 50 UG/ML
INJECTION, SOLUTION INTRAMUSCULAR; INTRAVENOUS
Status: COMPLETED
Start: 2019-06-08 | End: 2019-06-08

## 2019-06-08 RX ORDER — DIPHENHYDRAMINE HYDROCHLORIDE 50 MG/ML
12.5 INJECTION INTRAMUSCULAR; INTRAVENOUS
Status: DISCONTINUED | OUTPATIENT
Start: 2019-06-08 | End: 2019-06-08 | Stop reason: HOSPADM

## 2019-06-08 RX ORDER — CEFAZOLIN SODIUM 1 G/50ML
1 INJECTION, SOLUTION INTRAVENOUS EVERY 8 HOURS
Status: DISCONTINUED | OUTPATIENT
Start: 2019-06-08 | End: 2019-06-08

## 2019-06-08 RX ORDER — OXYCODONE AND ACETAMINOPHEN 7.5; 325 MG/1; MG/1
1 TABLET ORAL ONCE AS NEEDED
Status: DISCONTINUED | OUTPATIENT
Start: 2019-06-08 | End: 2019-06-08 | Stop reason: HOSPADM

## 2019-06-08 RX ORDER — LIDOCAINE HYDROCHLORIDE 10 MG/ML
0.5 INJECTION, SOLUTION EPIDURAL; INFILTRATION; INTRACAUDAL; PERINEURAL ONCE AS NEEDED
Status: DISCONTINUED | OUTPATIENT
Start: 2019-06-08 | End: 2019-06-08 | Stop reason: HOSPADM

## 2019-06-08 RX ORDER — FENTANYL CITRATE 50 UG/ML
INJECTION, SOLUTION INTRAMUSCULAR; INTRAVENOUS AS NEEDED
Status: DISCONTINUED | OUTPATIENT
Start: 2019-06-08 | End: 2019-06-08 | Stop reason: SURG

## 2019-06-08 RX ORDER — ROCURONIUM BROMIDE 10 MG/ML
INJECTION, SOLUTION INTRAVENOUS AS NEEDED
Status: DISCONTINUED | OUTPATIENT
Start: 2019-06-08 | End: 2019-06-08 | Stop reason: SURG

## 2019-06-08 RX ORDER — SODIUM CHLORIDE, SODIUM LACTATE, POTASSIUM CHLORIDE, CALCIUM CHLORIDE 600; 310; 30; 20 MG/100ML; MG/100ML; MG/100ML; MG/100ML
9 INJECTION, SOLUTION INTRAVENOUS CONTINUOUS
Status: DISCONTINUED | OUTPATIENT
Start: 2019-06-08 | End: 2019-06-08

## 2019-06-08 RX ORDER — NALOXONE HCL 0.4 MG/ML
0.2 VIAL (ML) INJECTION AS NEEDED
Status: DISCONTINUED | OUTPATIENT
Start: 2019-06-08 | End: 2019-06-08 | Stop reason: HOSPADM

## 2019-06-08 RX ORDER — HYDROCODONE BITARTRATE AND ACETAMINOPHEN 7.5; 325 MG/1; MG/1
1 TABLET ORAL EVERY 4 HOURS PRN
Status: DISCONTINUED | OUTPATIENT
Start: 2019-06-08 | End: 2019-06-09 | Stop reason: HOSPADM

## 2019-06-08 RX ORDER — FLUMAZENIL 0.1 MG/ML
0.2 INJECTION INTRAVENOUS AS NEEDED
Status: DISCONTINUED | OUTPATIENT
Start: 2019-06-08 | End: 2019-06-08 | Stop reason: HOSPADM

## 2019-06-08 RX ADMIN — SERTRALINE 100 MG: 100 TABLET, FILM COATED ORAL at 15:38

## 2019-06-08 RX ADMIN — ROCURONIUM BROMIDE 35 MG: 10 INJECTION INTRAVENOUS at 08:52

## 2019-06-08 RX ADMIN — FENTANYL CITRATE 50 MCG: 50 INJECTION INTRAMUSCULAR; INTRAVENOUS at 09:00

## 2019-06-08 RX ADMIN — FAMOTIDINE 20 MG: 10 INJECTION INTRAVENOUS at 08:32

## 2019-06-08 RX ADMIN — SODIUM CHLORIDE 100 ML/HR: 9 INJECTION, SOLUTION INTRAVENOUS at 17:58

## 2019-06-08 RX ADMIN — FENTANYL CITRATE 50 MCG: 50 INJECTION INTRAMUSCULAR; INTRAVENOUS at 10:05

## 2019-06-08 RX ADMIN — HYDROXYCHLOROQUINE SULFATE 200 MG: 200 TABLET, FILM COATED ORAL at 17:52

## 2019-06-08 RX ADMIN — FENTANYL CITRATE 50 MCG: 50 INJECTION INTRAMUSCULAR; INTRAVENOUS at 08:32

## 2019-06-08 RX ADMIN — SODIUM CHLORIDE, POTASSIUM CHLORIDE, SODIUM LACTATE AND CALCIUM CHLORIDE 9 ML/HR: 600; 310; 30; 20 INJECTION, SOLUTION INTRAVENOUS at 08:32

## 2019-06-08 RX ADMIN — HYDROMORPHONE HYDROCHLORIDE 0.5 MG: 1 INJECTION, SOLUTION INTRAMUSCULAR; INTRAVENOUS; SUBCUTANEOUS at 06:08

## 2019-06-08 RX ADMIN — FENTANYL CITRATE 50 MCG: 50 INJECTION INTRAMUSCULAR; INTRAVENOUS at 08:52

## 2019-06-08 RX ADMIN — BUDESONIDE AND FORMOTEROL FUMARATE DIHYDRATE 2 PUFF: 80; 4.5 AEROSOL RESPIRATORY (INHALATION) at 11:22

## 2019-06-08 RX ADMIN — MIDAZOLAM 1 MG: 1 INJECTION INTRAMUSCULAR; INTRAVENOUS at 08:32

## 2019-06-08 RX ADMIN — METOPROLOL SUCCINATE 25 MG: 25 TABLET, FILM COATED, EXTENDED RELEASE ORAL at 07:51

## 2019-06-08 RX ADMIN — SUGAMMADEX 400 MG: 100 INJECTION, SOLUTION INTRAVENOUS at 09:16

## 2019-06-08 RX ADMIN — BUDESONIDE AND FORMOTEROL FUMARATE DIHYDRATE 2 PUFF: 80; 4.5 AEROSOL RESPIRATORY (INHALATION) at 21:03

## 2019-06-08 RX ADMIN — HYDROCODONE BITARTRATE AND ACETAMINOPHEN 1 TABLET: 7.5; 325 TABLET ORAL at 15:37

## 2019-06-08 RX ADMIN — PROPOFOL 120 MG: 10 INJECTION, EMULSION INTRAVENOUS at 08:52

## 2019-06-08 RX ADMIN — LIDOCAINE HYDROCHLORIDE 60 MG: 20 INJECTION, SOLUTION INFILTRATION; PERINEURAL at 08:52

## 2019-06-08 RX ADMIN — FOLIC ACID 1 MG: 1 TABLET ORAL at 15:38

## 2019-06-08 RX ADMIN — FENTANYL CITRATE 50 MCG: 50 INJECTION INTRAMUSCULAR; INTRAVENOUS at 09:35

## 2019-06-08 RX ADMIN — CALCIUM CARBONATE 625 MG: 1250 TABLET ORAL at 17:53

## 2019-06-08 NOTE — PLAN OF CARE
Problem: Patient Care Overview  Goal: Plan of Care Review  Outcome: Ongoing (interventions implemented as appropriate)   06/08/19 2187   Coping/Psychosocial   Plan of Care Reviewed With patient   Plan of Care Review   Progress no change   OTHER   Outcome Summary vss, lle shortened and externally rotated and neurovascular status wnl, has purwick, reports adequate pain control, to be seen by orthopedis this am, possible surgery today, will continue to monitor     Goal: Individualization and Mutuality  Outcome: Ongoing (interventions implemented as appropriate)    Goal: Discharge Needs Assessment  Outcome: Ongoing (interventions implemented as appropriate)    Goal: Interprofessional Rounds/Family Conf  Outcome: Ongoing (interventions implemented as appropriate)      Problem: Fall Risk (Adult)  Goal: Identify Related Risk Factors and Signs and Symptoms  Outcome: Outcome(s) achieved Date Met: 06/08/19    Goal: Absence of Fall  Outcome: Ongoing (interventions implemented as appropriate)      Problem: Pain, Acute (Adult)  Goal: Identify Related Risk Factors and Signs and Symptoms  Outcome: Ongoing (interventions implemented as appropriate)    Goal: Acceptable Pain Control/Comfort Level  Outcome: Ongoing (interventions implemented as appropriate)      Problem: Skin Injury Risk (Adult)  Goal: Identify Related Risk Factors and Signs and Symptoms  Outcome: Ongoing (interventions implemented as appropriate)    Goal: Skin Health and Integrity  Outcome: Ongoing (interventions implemented as appropriate)

## 2019-06-08 NOTE — PROGRESS NOTES
Discharge Planning Assessment  [facility]     Patient Name: Barbara Segovia  MRN: [id number]  Today's Date: 6/8/2019    Admit Date: 6/7/2019    Discharge Needs Assessment     Row Name 06/08/19 1836       Living Environment    Lives With  significant other    Name(s) of Who Lives With Patient  Tommie Matos(significant other)     Current Living Arrangements  home/apartment/condo 1st floor apartment with no steps    Primary Care Provided by  self    Provides Primary Care For  no one    Family Caregiver if Needed  significant other    Family Caregiver Names  Tommie Matos(significant other)     Quality of Family Relationships  helpful;involved;supportive    Able to Return to Prior Arrangements  yes       Resource/Environmental Concerns    Resource/Environmental Concerns  none    Transportation Concerns  car, none       Transition Planning    Patient/Family Anticipates Transition to  home with family;home with help/services    Patient/Family Anticipated Services at Transition  home health care    Transportation Anticipated  family or friend will provide       Discharge Needs Assessment    Readmission Within the Last 30 Days  no previous admission in last 30 days    Concerns to be Addressed  basic needs    Equipment Currently Used at Home  nebulizer;cane, straight;walker, rolling;rollator;wheelchair;other (see comments) transfer board    Anticipated Changes Related to Illness  inability to care for self    Equipment Needed After Discharge  none    Outpatient/Agency/Support Group Needs  homecare agency    Discharge Facility/Level of Care Needs  home with home health    Offered/Gave Vendor List  yes        Discharge Plan     Row Name 06/08/19 1838       Plan    Plan  Home with significant other and [facility]     Patient/Family in Agreement with Plan  yes    Plan Comments  Introduced self and explained role of CCPSHAKA checked and facesheet verified with patient at chairside.  Patient states her  emergency contact is her significant other, Tommie Matos([phone]) and she lives with him in a 1st floor apartment with no steps to enter house.  Patient states she currently has a nebulizer, straight cane, rolling walker, rollator walker, wheelchair, and a transfer board and denies any DME needs at discharge.  Patient states her PCP is Ramiro Briones MD and she uses [email or web address] at [address] [place] [address] and denies any issues with affording or obtaining medications.  Patient states she would like to use [facility]([place] notified) and plans to return home with significant other, who will provide transportation at discharge.  CCP will follow and assist as needed.... Kirsten Mcgowan RN,CCP         Destination      No service coordination in this encounter.      Durable Medical Equipment      No service coordination in this encounter.      Dialysis/Infusion      No service coordination in this encounter.      Home Medical Care      No service coordination in this encounter.      Therapy      No service coordination in this encounter.      Community Resources      No service coordination in this encounter.          Demographic Summary     Row Name 06/08/19 0557       General Information    Admission Type  observation    Arrived From  home    Required Notices Provided  Observation Status Notice    Referral Source  nursing    Reason for Consult  discharge planning    Preferred Language  English       Contact Information    Permission Granted to Share Info With      Contact Information Obtained for          Functional Status     Row Name 06/08/19 7885       Functional Status    Usual Activity Tolerance  good    Current Activity Tolerance  fair       Functional Status, IADL    Medications  independent    Meal Preparation  independent    Housekeeping  assistive person    Laundry  assistive person    Shopping  assistive person       Mental Status    General Appearance WDL   WDL       Mental Status Summary    Recent Changes in Mental Status/Cognitive Functioning  no changes       Employment/    Employment Status  retired        Psychosocial    No documentation.       Abuse/Neglect    No documentation.       Legal    No documentation.       Substance Abuse    No documentation.       Patient Forms     Row Name 06/08/19 1838       Patient Forms    Provider Choice List  Delivered home health provider list    Delivered to  Patient    Method of delivery  In person            Kirsten Mcgowan RN

## 2019-06-08 NOTE — ANESTHESIA POSTPROCEDURE EVALUATION
"Patient: Barbara Segovia    Procedure Summary     Date:  06/08/19 Room / Location:  [facility] OR [address] [place] [address] MAIN OR    Anesthesia Start:  0846 Anesthesia Stop:  0927    Procedure:  HIP CLOSED REDUCTION (Left Hip) Diagnosis:      Surgeon:  Shant Cook MD Provider:  Ev Mcmanus MD    Anesthesia Type:  general ASA Status:  3          Anesthesia Type: general  Last vitals  BP   117/66 (06/08/19 1030)   Temp   36.9 °C (98.4 °F) (06/08/19 1015)   Pulse   95 (06/08/19 1030)   Resp   16 (06/08/19 1030)     SpO2   96 % (06/08/19 1030)     Post Anesthesia Care and Evaluation    Patient location during evaluation: PACU  Patient participation: complete - patient participated  Level of consciousness: awake and alert  Pain management: adequate  Airway patency: patent  Anesthetic complications: No anesthetic complications    Cardiovascular status: acceptable  Respiratory status: acceptable  Hydration status: acceptable    Comments: /66 (BP Location: Right arm, Patient Position: Lying)   Pulse 95   Temp 36.9 °C (98.4 °F) (Oral)   Resp 16   Ht 154.9 cm (60.98\")   Wt 75 kg (165 lb 6.4 oz)   SpO2 96%   BMI 31.27 kg/m²         "

## 2019-06-08 NOTE — PROGRESS NOTES
"DAILY PROGRESS NOTE  [facility]    Patient Identification:  Name: Barbara Segovia  Age: 73 y.o.  Sex: female  :  1945  MRN: [id number]         Primary Care Physician: Ramiro Briones MD    Subjective:  Interval History:She complains of hip pain    Objective:    Scheduled Meds:    [START ON 2019] aspirin 325 mg Oral Daily   budesonide-formoterol 2 puff Inhalation BID - RT   calcium carbonate 625 mg Oral BID With Meals   folic acid 1 mg Oral Daily   hydroxychloroquine 200 mg Oral Q PM   levothyroxine 75 mcg Oral Q AM   metoprolol succinate XL 25 mg Oral Daily   pantoprazole 40 mg Oral Daily   sertraline 100 mg Oral Daily   sodium chloride 3 mL Intravenous Q12H   Tofacitinib Citrate 5 mg Oral BID   [START ON 6/10/2019] vitamin B-12 500 mcg Oral Once per day on    vitamin D 50,000 Units Oral Q14 Days     Continuous Infusions:    sodium chloride 100 mL/hr Last Rate: 100 mL/hr (19 1524)       Vital signs in last 24 hours:  Temp:  [97.3 °F (36.3 °C)-98.4 °F (36.9 °C)] 97.3 °F (36.3 °C)  Heart Rate:  [] 99  Resp:  [16-20] 16  BP: (102-128)/(58-95) 102/62    Intake/Output:    Intake/Output Summary (Last 24 hours) at 2019 1550  Last data filed at 2019 1359  Gross per 24 hour   Intake 1039.95 ml   Output 500 ml   Net 539.95 ml       Exam:  /62 (BP Location: Right arm, Patient Position: Sitting)   Pulse 99   Temp 97.3 °F (36.3 °C) (Oral)   Resp 16   Ht 154.9 cm (60.98\")   Wt 75 kg (165 lb 6.4 oz)   SpO2 99%   BMI 31.27 kg/m²     General Appearance:    Sleepy, cooperative, no distress   Head:    Normocephalic, without obvious abnormality, atraumatic   Eyes:       Throat:   Lips, tongue, gums normal   Neck:   Supple, symmetrical, trachea midline, no JVD   Lungs:     Clear to auscultation bilaterally, respirations unlabored   Chest Wall:    No tenderness or deformity    Heart:    Regular rate and rhythm, S1 and S2 normal, no murmur,no  Rub or gallop "   Abdomen:     Soft, non-tender, bowel sounds active, no masses, no organomegaly    Extremities:   Extremities normal, atraumatic, no cyanosis or edema   Pulses:      Skin:   Skin is warm and dry,  no rashes or palpable lesions   Neurologic:   no focal deficits noted      Lab Results (last 72 hours)     Procedure Component Value Units Date/Time    Troponin [[id number]]  (Normal) Collected:  06/08/19 0311    Specimen:  Blood Updated:  06/08/19 0350     Troponin T <0.010 ng/mL     Narrative:       Troponin T Reference Range:  <= 0.03 ng/mL-   Negative for AMI  >0.03 ng/mL-     Abnormal for myocardial necrosis.  Clinicians would have to utilize clinical acumen, EKG, Troponin and serial changes to determine if it is an Acute Myocardial Infarction or myocardial injury due to an underlying chronic condition.     Comprehensive Metabolic Panel [[id number]]  (Abnormal) Collected:  06/08/19 0311    Specimen:  Blood Updated:  06/08/19 0346     Glucose 108 mg/dL      BUN 17 mg/dL      Creatinine 0.87 mg/dL      Sodium 137 mmol/L      Potassium 4.4 mmol/L      Chloride 103 mmol/L      CO2 23.0 mmol/L      Calcium 9.2 mg/dL      Total Protein 6.7 g/dL      Albumin 3.80 g/dL      ALT (SGPT) 14 U/L      AST (SGOT) 25 U/L      Alkaline Phosphatase 74 U/L      Total Bilirubin 0.4 mg/dL      eGFR Non African Amer 64 mL/min/1.73      Globulin 2.9 gm/dL      A/G Ratio 1.3 g/dL      BUN/Creatinine Ratio 19.5     Anion Gap 11.0 mmol/L     Narrative:       GFR Normal >60  Chronic Kidney Disease <60  Kidney Failure <15    Hemoglobin A1c [[id number]]  (Normal) Collected:  06/08/19 0311    Specimen:  Blood Updated:  06/08/19 0332     Hemoglobin A1C 5.10 %     Narrative:       Hemoglobin A1C Ranges:    Increased Risk for Diabetes  5.7% to 6.4%  Diabetes                     >= 6.5%  Diabetic Goal                < 7.0%    CBC Auto Differential [[id number]]  (Abnormal) Collected:  06/08/19 0311    Specimen:  Blood Updated:  06/08/19 0326     WBC 7.72  10*3/mm3      RBC 3.52 10*6/mm3      Hemoglobin 11.3 g/dL      Hematocrit 35.4 %      .6 fL      MCH 32.1 pg      MCHC 31.9 g/dL      RDW 12.3 %      RDW-SD 46.0 fl      MPV 9.5 fL      Platelets 179 10*3/mm3      Neutrophil % 87.3 %      Lymphocyte % 3.9 %      Monocyte % 8.0 %      Eosinophil % 0.0 %      Basophil % 0.3 %      Immature Grans % 0.5 %      Neutrophils, Absolute 6.74 10*3/mm3      Lymphocytes, Absolute 0.30 10*3/mm3      Monocytes, Absolute 0.62 10*3/mm3      Eosinophils, Absolute 0.00 10*3/mm3      Basophils, Absolute 0.02 10*3/mm3      Immature Grans, Absolute 0.04 10*3/mm3      nRBC 0.0 /100 WBC         Data Review:  Results from last 7 days   Lab Units 06/08/19  0311   SODIUM mmol/L 137   POTASSIUM mmol/L 4.4   CHLORIDE mmol/L 103   CO2 mmol/L 23.0   BUN mg/dL 17   CREATININE mg/dL 0.87   GLUCOSE mg/dL 108*   CALCIUM mg/dL 9.2     Results from last 7 days   Lab Units 06/08/19  0311   WBC 10*3/mm3 7.72   HEMOGLOBIN g/dL 11.3*   HEMATOCRIT % 35.4   PLATELETS 10*3/mm3 179         Results from last 7 days   Lab Units 06/08/19  0311   HEMOGLOBIN A1C % 5.10     Lab Results   Lab Value Date/Time    TROPONINT <0.010 06/08/2019 0311    TROPONINT <0.010 07/13/2018 1219         Results from last 7 days   Lab Units 06/08/19  0311   ALK PHOS U/L 74   BILIRUBIN mg/dL 0.4   ALT (SGPT) U/L 14   AST (SGOT) U/L 25         Results from last 7 days   Lab Units 06/08/19  0311   HEMOGLOBIN A1C % 5.10     No results found for: POCGLU        Past Medical History:   Diagnosis Date   • Arthritis     rheumatoid arthritis   • Asthma    • Cancer (CMS/HCC)     basal cell skin cancer   • COPD (chronic obstructive pulmonary disease) (CMS/HCC)    • Disease of thyroid gland    • Elevated cholesterol    • GERD (gastroesophageal reflux disease)    • History of transfusion    • Hypertension    • PONV (postoperative nausea and vomiting)    • Third degree heart block (CMS/HCC)        Assessment:  Active Hospital Problems     Diagnosis  POA   • **Dislocated hip, left, initial encounter (CMS/[facility]) [S73.005A]  Yes   • HTN (hypertension) [I10]  Yes   • Hypothyroid [E03.9]  Yes   • Rheumatoid arthritis (CMS/[facility]) [M06.9]  Yes   • COPD (chronic obstructive pulmonary disease) (CMS/[facility]) [J44.9]  Yes      Resolved Hospital Problems    Diagnosis Date Resolved POA   • Dislocated hip, right, initial encounter (CMS/[facility]) [S73.004A] 06/08/2019 Yes       Plan:  See how she does with PT. Hopefully will feel well enough to go home tomorrow. Discussed with nurse.    Bishop Lynn MD  6/8/2019  3:50 PM

## 2019-06-08 NOTE — H&P
Internal medicine history and physical  INTERNAL MEDICINE   [facility]       Patient Identification:  Name: Barbara Segvoia  Age: 73 y.o.  Sex: female  :  1945  MRN: [id number]                     Primary Care Physician: Ramiro Briones MD                                   Chief Complaint: Severe pain and discomfort in her left hip after she attempted to take something out under the bed using her legs and heard a pop earlier today.    History of Present Illness:   Patient is a 73-year-old female who has history of severe deforming rheumatoid arthritis and has had left hip total arthroplasty long time ago by Dr. Mancini.  Since then patient has multiple issues and dislocations requiring various type of surgeries.  Her last dislocation was 7 years ago and she has done well since then until 2 weeks ago when she started having increasing pain and discomfort in her hips.  She saw her rheumatologist and was diagnosed with flare of her rheumatoid arthritis.  She was given some steroids.  Patient in the interim was able to ambulate and bear weight on her lower extremities albeit with difficulty.  In that background today patient was able to get to the shower and was trying to get something from underneath her bed and in that process to use her leg to get the stuff out and heard a pop and afterwards she was in pain and discomfort.  Her girlfriend called the EMS and she was eventually taken to [facility] emergency room where she was found to have dislocation of her left hip.  Her case was discussed with orthopedic surgeon .  Patient was accepted by my colleague earlier today and she eventually arrived to our facility around 930 or 10:00.  Patient denies any fever or chills.      Past Medical History:  Past Medical History:   Diagnosis Date   • Arthritis     rheumatoid arthritis   • Asthma    • Cancer (CMS/HCC)     basal cell skin cancer   • COPD (chronic obstructive pulmonary disease)  (CMS/HCC)    • Disease of thyroid gland    • Elevated cholesterol    • GERD (gastroesophageal reflux disease)    • History of transfusion    • Hypertension    • Third degree heart block (CMS/HCC)      Past Surgical History:  Past Surgical History:   Procedure Laterality Date   • CARDIAC ELECTROPHYSIOLOGY PROCEDURE N/A 7/13/2018    Procedure: Pacemaker DC new;  Surgeon: Duke Castillo MD;  Location: [facility] INVASIVE LOCATION;  Service: Cardiovascular   • EYE SURGERY     • FOOT SURGERY Right     hammer toe   • HAND SURGERY Right    • HYSTERECTOMY     • JOINT REPLACEMENT     • PACEMAKER IMPLANTATION  07/13/2018      Home Meds:  Medications Prior to Admission   Medication Sig Dispense Refill Last Dose   • albuterol (PROVENTIL HFA;VENTOLIN HFA) 108 (90 Base) MCG/ACT inhaler Inhale 2 puffs Every 4 (Four) Hours As Needed for Wheezing.   Taking   • aspirin 81 MG EC tablet Take 81 mg by mouth Every Evening.   Taking   • calcium carbonate (OS-CANDIDO) 600 MG tablet Take 900 mg by mouth Every Evening.   Taking   • fluticasone-salmeterol (ADVAIR) 100-50 MCG/DOSE DISKUS Inhale 2 (Two) Times a Day As Needed.   Taking   • folic acid (FOLVITE) 1 MG tablet Take 1 mg by mouth 2 (Two) Times a Day.   Taking   • HYDROcodone-acetaminophen (NORCO) 7.5-325 MG per tablet Take 1 tablet by mouth 2 (Two) Times a Day As Needed for Moderate Pain .   Taking   • hydroxychloroquine (PLAQUENIL) 200 MG tablet Take 200 mg by mouth Every Evening.   Taking   • levothyroxine (SYNTHROID, LEVOTHROID) 75 MCG tablet Take 75 mcg by mouth Daily.      • methotrexate 2.5 MG tablet Take 10 mg by mouth 1 (One) Time Per Week. wednesdays   Taking   • metoprolol succinate XL (TOPROL-XL) 25 MG 24 hr tablet Take 1 tablet by mouth Daily. 90 tablet 1    • pantoprazole (PROTONIX) 40 MG EC tablet Take 40 mg by mouth Daily.      • sertraline (ZOLOFT) 100 MG tablet Take 100 mg by mouth Daily.   Taking   • Tofacitinib Citrate (XELJANZ) 5 MG tablet Take 5 mg by mouth 2  (Two) Times a Day.   Taking   • vitamin B-12 (CYANOCOBALAMIN) 500 MCG tablet Take 500 mcg by mouth 3 (Three) Times a Week.      • vitamin D (ERGOCALCIFEROL) 30436 units capsule capsule Take 50,000 Units by mouth Every 14 (Fourteen) Days.      • benazepril (LOTENSIN) 20 MG tablet Take 20 mg by mouth Daily.   Taking   • levothyroxine (SYNTHROID, LEVOTHROID) 88 MCG tablet Take 88 mcg by mouth Daily.   Taking   • raNITIdine (ZANTAC) 150 MG tablet Take 150 mg by mouth Every Night.   Taking   • vitamin B-12 (CYANOCOBALAMIN) 500 MCG tablet Take 500 mcg by mouth Daily.   Taking     Current Meds:     Current Facility-Administered Medications:   •  acetaminophen (TYLENOL) tablet 650 mg, 650 mg, Oral, Q4H PRN, Lissette Akhtar MD  •  albuterol (PROVENTIL) nebulizer solution 0.083% 2.5 mg/3mL, 2.5 mg, Nebulization, Q6H PRN, Lissette Akhtar MD  •  budesonide-formoterol (SYMBICORT) 80-4.5 MCG/ACT inhaler 2 puff, 2 puff, Inhalation, BID - RT, Lissette Akhtar MD  •  [START ON 6/8/2019] calcium carbonate (OS-CANDIDO) tablet 625 mg, 625 mg, Oral, BID With Meals, Lissette Akhtar MD  •  [START ON 6/8/2019] folic acid (FOLVITE) tablet 1 mg, 1 mg, Oral, Daily, Lissette Akhtar MD  •  HYDROcodone-acetaminophen (NORCO) 7.5-325 MG per tablet 1 tablet, 1 tablet, Oral, BID PRN, Lissette Akhtar MD, 1 tablet at 06/07/19 1214  •  HYDROmorphone (DILAUDID) injection 0.5 mg, 0.5 mg, Intravenous, Q2H PRN, Lissette Akhtar MD  •  [START ON 6/8/2019] hydroxychloroquine (PLAQUENIL) tablet 200 mg, 200 mg, Oral, Q PM, Lissette Akhtar MD  •  [START ON 6/8/2019] levothyroxine (SYNTHROID, LEVOTHROID) tablet 75 mcg, 75 mcg, Oral, Q AM, Lissette Akhtar MD  •  [START ON 6/8/2019] metoprolol succinate XL (TOPROL-XL) 24 hr tablet 25 mg, 25 mg, Oral, Daily, Lissette Akhtar MD  •  morphine injection 1 mg, 1 mg, Intravenous, Q4H PRN **AND** naloxone (NARCAN) injection 0.4 mg, 0.4 mg, Intravenous, Q5 Min PRN, Lissette Akhtar MD  •  ondansetron (ZOFRAN) injection 4 mg, 4 mg, Intravenous, Q6H  PRN, Lissette Akhtar MD  •  [START ON 6/8/2019] pantoprazole (PROTONIX) EC tablet 40 mg, 40 mg, Oral, Daily, Lissette Akhtar MD  •  [START ON 6/8/2019] sertraline (ZOLOFT) tablet 100 mg, 100 mg, Oral, Daily, Lissette Akhtar MD  •  sodium chloride 0.9 % flush 3 mL, 3 mL, Intravenous, Q12H, Lissette Akhtar MD  •  sodium chloride 0.9 % flush 3-10 mL, 3-10 mL, Intravenous, PRN, Lissette Akhtar MD  •  sodium chloride 0.9 % infusion, 100 mL/hr, Intravenous, Continuous, Lissette Akhtar MD, Last Rate: 100 mL/hr at 06/07/19 2300, 100 mL/hr at 06/07/19 2300  •  Tofacitinib Citrate tablet 5 mg, 5 mg, Oral, BID, Lissette Akhtar MD  •  [START ON 6/10/2019] vitamin B-12 (CYANOCOBALAMIN) tablet 500 mcg, 500 mcg, Oral, Once per day on Mon Wed Fri, Lissette Akhtar MD  •  [START ON 6/8/2019] vitamin D (ERGOCALCIFEROL) capsule 50,000 Units, 50,000 Units, Oral, Q14 Days, Lissette Akhtar MD  Allergies:  Allergies   Allergen Reactions   • Penicillins    • Latex Rash   • Sulfa Antibiotics Rash     Social History:   Social History     Tobacco Use   • Smoking status: Never Smoker   • Tobacco comment: Patient reported that she does not smoke   Substance Use Topics   • Alcohol use: No      Family History:  Family History   Problem Relation Age of Onset   • Asthma Mother    • COPD Mother    • Cancer Father    • Hypertension Father    • Heart disease Father    • Diabetes Paternal Grandmother           Review of Systems  See history of present illness and past medical history.    progressive pain discomfort in her hips for the last couple weeks.  constitutional: Denies any fever and chills  Cardiovascular: No chest pain or shortness of breath  GI: No nausea vomiting or diarrhea  : Denies any burning in urination frequency urgency  Muscular skeletal: Remarkable for what has been described in history of presenting illness  Neurological: Remarkable for no loss of consciousness or continence.      Vitals:   /65 (BP Location: Right arm, Patient Position:  "Lying)   Pulse 63   Temp 98.3 °F (36.8 °C) (Oral)   Resp 16   Ht 154.9 cm (60.98\")   Wt 75 kg (165 lb 6.4 oz)   SpO2 96%   BMI 31.27 kg/m²   I/O: No intake or output data in the 24 hours ending 06/07/19 1131  Exam:  General Appearance:   Awake uncomfortable chronically ill female who appears older than stated age and has severe deforming rheumatoid arthritis involving her hands and feet.   Head:    Normocephalic, without obvious abnormality, atraumatic   Eyes:    PERRL, conjunctiva/corneas clear, EOM's intact, both eyes   Ears:    Normal external ear canals, both ears   Nose:   Nares normal, septum midline, mucosa normal, no drainage    or sinus tenderness   Throat:   Lips, tongue, gums normal; oral mucosa pink and moist   Neck:   Supple, symmetrical, trachea midline, no adenopathy;     thyroid:  no enlargement/tenderness/nodules; no carotid    bruit or JVD   Back:     Symmetric, no curvature, ROM normal, no CVA tenderness   Lungs:     Clear to auscultation bilaterally, respirations unlabored   Chest Wall:    No tenderness or deformity    Heart:    Regular rate and rhythm, S1 and S2 normal, no murmur, rub   or gallop   Abdomen:     Soft, non-tender, bowel sounds active all four quadrants,     no masses, no hepatomegaly, no splenomegaly   Extremities:  Left lower extremity is internally rotated and significantly tender in the groin area.   Pulses:   Pulses palpable in all extremities; symmetric all extremities   Skin:   Skin color normal, Skin is warm and dry,  no rashes or palpable lesions   Neurologic:   CNII-XII intact, motor strength grossly intact, sensation grossly intact to light touch, no focal deficits noted       Data Review:      I reviewed the patient's new clinical results.    Her labs at the outside facility show sodium 134 potassium 3.4 chloride 104 bicarb 22 glucose 132 BUN 21 creatinine 0.84 her CBC shows WBC 7.1 hemoglobin 13.7 and platelet 238 x-ray of the hip and pelvis shows cephalad and " lateral dislocation of the left humeral head and shaft component in relation to the acetabular component.        Assessment/plan/medical decision made:  Active Hospital Problems    Diagnosis POA   • Dislocated hip, right, initial encounter (CMS/[facility]) [S73.004A] Yes     Left hip dislocation history of recurrent dislocation of the left hip following left total hip arthroplasty requiring multiple surgeries in the past  Severe deforming rheumatoid arthritis continue her current regimen.  Patient is currently on steroid for recent flare as well as on methotrexate and other disease modifying agents.  Hypothyroidism-continue her thyroid replacement therapy  COPD-continue her home regimen  Hypertension-continue her antihypertensive regimen hold her blood pressure medicines if her systolic blood pressure is less than 120.  Preop clearance: Patient seems to have no specific cardiac symptoms such as chest pain shortness of breath or dyspnea on exertion or decreased functional capacity or orthopnea or PND.  He should be able to undergo needed orthopedic intervention with acceptable cardiovascular risk.  Patient is cleared for needed surgery or closed reduction under anesthesia as recommended by orthopedic surgery service.      Lissette Akhtar MD   6/7/2019  11:13 PM  Much of this encounter note is an electronic transcription/translation of spoken language to printed text. The electronic translation of spoken language may permit erroneous, or at times, nonsensical words or phrases to be inadvertently transcribed; Although I have reviewed the note for such errors, some may still exist

## 2019-06-08 NOTE — PLAN OF CARE
Problem: Patient Care Overview  Goal: Plan of Care Review  Outcome: Ongoing (interventions implemented as appropriate)   06/08/19 1941   Coping/Psychosocial   Plan of Care Reviewed With patient   Plan of Care Review   Progress improving   OTHER   Outcome Summary closed reduction of hip dislocation this morning, returned from PACU @ 1045, VSS, abd pillow in place, NS @ 100 cc/hr, lortab for pain x 1, up to BSC, up to chair, encouraged IS, weaned down to 2 LNC thus far, plan is to dc home tomorrow, educated on the importance of BP monitoring related to history of HTN     Goal: Individualization and Mutuality  Outcome: Ongoing (interventions implemented as appropriate)   06/08/19 0347   Individualization   Patient Specific Preferences prefers to be called Barbara   Patient Specific Goals (Include Timeframe) adequate pain control and improved mobility   Patient Specific Interventions offer pain meds prn and asst with mobility   Mutuality/Individual Preferences   What Anxieties, Fears, Concerns, or Questions Do You Have About Your Care? none stated   What Information Would Help Us Give You More Personalized Care? none stated   How Would You and/or Your Support Person Like to Participate in Your Care? none stated   Mutuality/Individual Preferences   How to Address Anxieties/Fears none stated     Goal: Discharge Needs Assessment  Outcome: Ongoing (interventions implemented as appropriate)   06/08/19 1836   Discharge Needs Assessment   Readmission Within the Last 30 Days no previous admission in last 30 days   Concerns to be Addressed basic needs   Patient/Family Anticipates Transition to home with family;home with help/services   Patient/Family Anticipated Services at Transition home health care   Transportation Concerns car, none   Transportation Anticipated family or friend will provide   Anticipated Changes Related to Illness inability to care for self   Equipment Needed After Discharge none   Outpatient/Agency/Support  Group Needs homecare agency   Discharge Facility/Level of Care Needs home with home health   Offered/Gave Vendor List yes   Disability   Equipment Currently Used at Home nebulizer;cane, straight;walker, rolling;rollator;wheelchair;other (see comments)  (transfer board)       Problem: Fall Risk (Adult)  Goal: Absence of Fall  Outcome: Ongoing (interventions implemented as appropriate)   06/08/19 1941   Fall Risk (Adult)   Absence of Fall achieves outcome       Problem: Pain, Acute (Adult)  Goal: Identify Related Risk Factors and Signs and Symptoms  Outcome: Outcome(s) achieved Date Met: 06/08/19 06/08/19 1941   Pain, Acute (Adult)   Related Risk Factors (Acute Pain) surgery   Signs and Symptoms (Acute Pain) verbalization of pain descriptors     Goal: Acceptable Pain Control/Comfort Level  Outcome: Ongoing (interventions implemented as appropriate)   06/08/19 1941   Pain, Acute (Adult)   Acceptable Pain Control/Comfort Level making progress toward outcome       Problem: Skin Injury Risk (Adult)  Goal: Identify Related Risk Factors and Signs and Symptoms  Outcome: Outcome(s) achieved Date Met: 06/08/19 06/08/19 1941   Skin Injury Risk (Adult)   Related Risk Factors (Skin Injury Risk) mobility impaired     Goal: Skin Health and Integrity  Outcome: Ongoing (interventions implemented as appropriate)   06/08/19 1941   Skin Injury Risk (Adult)   Skin Health and Integrity making progress toward outcome

## 2019-06-08 NOTE — CONSULTS
Orthopedic Consult      Patient: Barbara Segovia  YOB: 1945     Date of Admission: 6/7/2019 10:00 PM    Medical Record Number: [id number]    Attending Physician: Bishop Lynn MD    Consulting Physician: Shant Cook MD    Reason for Consult: Left total hip arthroplasty dislocation, irreducible    History of Present Illness: 73 y.o. female admitted to [facility] with Dislocated hip, left, initial encounter.     Today patient was able to get to the shower and was trying to get something from underneath her bed and in that process to use her leg to get the stuff out and heard a pop and afterwards she was in pain and discomfort.  Her girlfriend called the EMS and she was eventually taken to [facility] emergency room where she was found to have dislocation of her left hip.    Dr. Judge in the emergency room tried a closed reduction but was unsuccessful.  They have called our office who contacted me to see if I can evaluate and manage the patient.  I have recalled the emergency room and I have requested Dr. Judge to transfer the patient to [facility] for further evaluation and management.  Secondary to multiple comorbidities I have requested the hospitalist to admit.    She was transferred in late to the hospital.    Patient is a 73-year-old female who has history of severe deforming rheumatoid arthritis and has had left hip total arthroplasty long time ago by Dr. Mancini.  Since then patient has multiple issues and dislocations requiring various type of surgeries.  Her last dislocation was 7 years ago and she has done well since then until 2 weeks ago when she started having increasing pain and discomfort in her hips.  She saw her rheumatologist and was diagnosed with flare of her rheumatoid arthritis.  She was given some steroids.  Patient in the interim was able to ambulate and bear weight on her lower extremities albeit with difficulty.     She is accompanied by her  significant other to the hospital visit.  She reports experiencing left leg being longer chronically.      The patient denies history of dementia.    Patient denies any history of: DVT/PE, MRSA, COPD, CHF, CAD, Diabetes mellitus, Dementia or A-Fib.   The patient has history of : Rheumatoid arthritis, COPD  The patient is not  on anticoagulants:       Past medical history, Past surgical history, family history, Social history, current medications, home medications Have been reviewed by me.    Past Medical History:   Diagnosis Date   • Arthritis     rheumatoid arthritis   • Asthma    • Cancer (CMS/HCC)     basal cell skin cancer   • COPD (chronic obstructive pulmonary disease) (CMS/HCC)    • Disease of thyroid gland    • Elevated cholesterol    • GERD (gastroesophageal reflux disease)    • History of transfusion    • Hypertension    • PONV (postoperative nausea and vomiting)    • Third degree heart block (CMS/HCC)      Past Surgical History:   Procedure Laterality Date   • CARDIAC ELECTROPHYSIOLOGY PROCEDURE N/A 7/13/2018    Procedure: Pacemaker DC new;  Surgeon: Duke Castillo MD;  Location: [facility] INVASIVE LOCATION;  Service: Cardiovascular   • EYE SURGERY     • FOOT SURGERY Right     hammer toe   • HAND SURGERY Right    • HYSTERECTOMY     • JOINT REPLACEMENT     • PACEMAKER IMPLANTATION  07/13/2018     Social History     Occupational History   • Not on file   Tobacco Use   • Smoking status: Never Smoker   • Tobacco comment: Patient reported that she does not smoke   Substance and Sexual Activity   • Alcohol use: No   • Drug use: No   • Sexual activity: Defer    Social History     Social History Narrative   • Not on file     Family History   Problem Relation Age of Onset   • Asthma Mother    • COPD Mother    • Cancer Father    • Hypertension Father    • Heart disease Father    • Diabetes Paternal Grandmother           Allergies   Allergen Reactions   • Penicillins    • Latex Rash   • Sulfa Antibiotics Rash         Home Medications:  Medications Prior to Admission   Medication Sig Dispense Refill Last Dose   • albuterol (PROVENTIL HFA;VENTOLIN HFA) 108 (90 Base) MCG/ACT inhaler Inhale 2 puffs Every 4 (Four) Hours As Needed for Wheezing.   Taking   • aspirin 81 MG EC tablet Take 81 mg by mouth Every Evening.   Taking   • calcium carbonate (OS-CANDIDO) 600 MG tablet Take 900 mg by mouth Every Evening.   Taking   • fluticasone-salmeterol (ADVAIR) 100-50 MCG/DOSE DISKUS Inhale 2 (Two) Times a Day As Needed.   Taking   • folic acid (FOLVITE) 1 MG tablet Take 1 mg by mouth 2 (Two) Times a Day.   Taking   • HYDROcodone-acetaminophen (NORCO) 7.5-325 MG per tablet Take 1 tablet by mouth 2 (Two) Times a Day As Needed for Moderate Pain .   Taking   • hydroxychloroquine (PLAQUENIL) 200 MG tablet Take 200 mg by mouth Every Evening.   Taking   • levothyroxine (SYNTHROID, LEVOTHROID) 75 MCG tablet Take 75 mcg by mouth Daily.      • methotrexate 2.5 MG tablet Take 10 mg by mouth 1 (One) Time Per Week. wednesdays   Taking   • metoprolol succinate XL (TOPROL-XL) 25 MG 24 hr tablet Take 1 tablet by mouth Daily. 90 tablet 1 6/8/2019 at 0751   • pantoprazole (PROTONIX) 40 MG EC tablet Take 40 mg by mouth Daily.      • sertraline (ZOLOFT) 100 MG tablet Take 100 mg by mouth Daily.   Taking   • Tofacitinib Citrate (XELJANZ) 5 MG tablet Take 5 mg by mouth 2 (Two) Times a Day.   Taking   • vitamin B-12 (CYANOCOBALAMIN) 500 MCG tablet Take 500 mcg by mouth 3 (Three) Times a Week.      • vitamin D (ERGOCALCIFEROL) 22976 units capsule capsule Take 50,000 Units by mouth Every 14 (Fourteen) Days.      • benazepril (LOTENSIN) 20 MG tablet Take 20 mg by mouth Daily.   Taking   • levothyroxine (SYNTHROID, LEVOTHROID) 88 MCG tablet Take 88 mcg by mouth Daily.   Taking   • raNITIdine (ZANTAC) 150 MG tablet Take 150 mg by mouth Every Night.   Taking   • vitamin B-12 (CYANOCOBALAMIN) 500 MCG tablet Take 500 mcg by mouth Daily.   Taking       Current  Medications:  Scheduled Meds:  [MAR Hold] budesonide-formoterol 2 puff Inhalation BID - RT   [MAR Hold] calcium carbonate 625 mg Oral BID With Meals   [MAR Hold] folic acid 1 mg Oral Daily   [MAR Hold] hydroxychloroquine 200 mg Oral Q PM   [MAR Hold] levothyroxine 75 mcg Oral Q AM   metoprolol succinate XL 25 mg Oral Daily   [MAR Hold] pantoprazole 40 mg Oral Daily   [MAR Hold] sertraline 100 mg Oral Daily   [MAR Hold] sodium chloride 3 mL Intravenous Q12H   [MAR Hold] Tofacitinib Citrate 5 mg Oral BID   [MAR Hold] vitamin B-12 500 mcg Oral Once per day on Mon Wed Fri   [MAR Hold] vitamin D 50,000 Units Oral Q14 Days     Continuous Infusions:  lactated ringers 9 mL/hr Last Rate: 9 mL/hr (06/08/19 0832)   sodium chloride 100 mL/hr Last Rate: Stopped (06/08/19 0800)     PRN Meds:.•  acetaminophen **OR** acetaminophen  •  [MAR Hold] acetaminophen  •  [MAR Hold] albuterol  •  diphenhydrAMINE  •  diphenhydrAMINE  •  ePHEDrine  •  fentanyl  •  fentanyl  •  flumazenil  •  hydrALAZINE  •  [MAR Hold] HYDROcodone-acetaminophen  •  HYDROcodone-acetaminophen  •  [MAR Hold] HYDROmorphone  •  HYDROmorphone  •  labetalol  •  lidocaine PF 1%  •  midazolam **OR** midazolam  •  [MAR Hold] Morphine **AND** [MAR Hold] naloxone  •  naloxone  •  [MAR Hold] ondansetron  •  ondansetron  •  oxyCODONE-acetaminophen  •  promethazine **OR** promethazine **OR** promethazine **OR** promethazine  •  sodium chloride  •  [MAR Hold] sodium chloride    Review of Systems:   A 12 point system review was reviewed with the patient and from the chart  and is negative except as mentioned in history of present illness.      Physical Exam: 73 y.o. female                    Vitals:    06/07/19 2205 06/08/19 0300 06/08/19 0700 06/08/19 0806   BP: 117/65 117/69 125/58 119/95   BP Location: Right arm Right arm Left arm Right arm   Patient Position: Lying Lying Lying Lying   Pulse: 63 70 65    Resp: 16 16 16 16   Temp: 98.3 °F (36.8 °C) 98 °F (36.7 °C) 97.4 °F  "(36.3 °C) 98.4 °F (36.9 °C)   TempSrc: Oral Oral Oral Oral   SpO2: 96% 96% 96%    Weight: 75 kg (165 lb 6.4 oz)      Height: 154.9 cm (60.98\")           Gait: Not evaluated.     Mental/HEENT/cardio/skin: The patient's general appearance was well-nourished, well-hydrated, no acute distress.  Orientation was alert and oriented ×3.  The patient's mood was normal.   Pulmonary exam shows normal late exchange, no labored breathing, or shortness of breath.    The  skin exam showed normal temperature and color in the area of examination.    Extremities: Patient is comfortably lying in bed.  Her left lower extremity positive shortening, positive internal rotation, attempted movements of the left leg are painful and restricted.  She has multiple deformities of her fingers and toes secondary to her chronic rheumatoid arthritis.    Pulses:  Pulses palpable and equal bilaterally    Diagnostic Tests:    Results from last 7 days   Lab Units 06/08/19  0311   WBC 10*3/mm3 7.72   HEMOGLOBIN g/dL 11.3*   HEMATOCRIT % 35.4   PLATELETS 10*3/mm3 179     Results from last 7 days   Lab Units 06/08/19  0311   SODIUM mmol/L 137   POTASSIUM mmol/L 4.4   CHLORIDE mmol/L 103   CO2 mmol/L 23.0   BUN mg/dL 17   CREATININE mg/dL 0.87   GLUCOSE mg/dL 108*   CALCIUM mg/dL 9.2         No results found for: URICACID  No results found for: CRYSTAL  Microbiology Results (last 10 days)     ** No results found for the last 240 hours. **        Fl C Arm During Surgery    Result Date: 6/8/2019   As described.  This report was finalized on 6/8/2019 9:22 AM by Dr. Jayce Salcedo M.D.      Xr Hip With Or Without Pelvis 1 View Left    Result Date: 6/8/2019   As described.  This report was finalized on 6/8/2019 9:22 AM by Dr. Jayce Salcedo M.D.        The labs, X-ray results for preoperative evaluation have been reviewed by me.    Assessment: Left total hip arthroplasty posterior dislocation    Patient Active Problem List   Diagnosis   • Third degree " heart block (CMS/HCC)   • Diarrhea   • HTN (hypertension)   • Hypothyroid   • Rheumatoid arthritis (CMS/HCC)   • COPD (chronic obstructive pulmonary disease) (CMS/HCC)       Plan:    I was able to review her dislocation films from the [facility] PACS system.  Options and alternatives were discussed in detail with the patient and   family.   The patient is indicated for closed reduction and further evaluation under anesthesia.     I was able to review her records.  The operative reports were from our office.    She underwent a revision of her left total hip arthroplasty in July 2009 with a Jermaine tantalum cup and a protrusio cage and a cemented polyethylene liner.  This was a 56 tantalum cup and a cemented 46 polyethylene liner with 28 mm internal diameter.  Anteverted neck +3 mm offset head.    Apparently she continued to have some dislocations and she was revised in January 2010 to a +7 head length.  She has done well over the past 7 to 8 years.    The fact that she has been having stiffness for the past 2 weeks, Indicates the possibility that she might be having significant polyethylene wear/failure of the polyethylene liner.  I do not think that she has any loosening of the implants from the underlying bone.    I do believe eventually she will need a revision of the left total hip arthroplasty.  Several options will be available basically to do a liner change.  Possibility of a dual mobility cup/constrained ring will be considered.  Further decisions will be made based upon the   Results of the closed reduction.    The patient and her family expressed understanding of the plan.    Patient is n.p.o.  Informed consent obtained.  Will go to the OR.    Date: 6/8/2019    Shant Cook MD     CC: Ramiro Briones MD; MD Shane Brito Lyle E, MD

## 2019-06-08 NOTE — ANESTHESIA PROCEDURE NOTES
Airway  Urgency: elective    Date/Time: 6/8/2019 8:54 AM  Difficult airway    General Information and Staff    Patient location during procedure: OR  Anesthesiologist: Ev Mcmanus MD  CRNA: Sergey Barnes CRNA    Indications and Patient Condition  Indications for airway management: airway protection    Preoxygenated: yes  MILS maintained throughout  Mask difficulty assessment: 2 - vent by mask + OA or adjuvant +/- NMBA    Final Airway Details  Final airway type: endotracheal airway      Successful airway: ETT  Cuffed: yes   Successful intubation technique: video laryngoscopy  Endotracheal tube insertion site: oral  Blade: CMAC  Blade size: D  ETT size (mm): 7.0  Cormack-Lehane Classification: grade IIa - partial view of glottis  Placement verified by: chest auscultation and capnometry   Measured from: teeth  ETT to teeth (cm): 19  Number of attempts at approach: 2

## 2019-06-08 NOTE — OP NOTE
Operative  Note    Patient: Barbara Segovia  YOB: 1945  Medical Record Number: [id number]  Attending Physician: Bishop Lynn MD  Primary Care Physician: Ramiro Briones MD    Date of Service: 6/8/2019      PREOPERATIVE DIAGNOSIS:   1.  Left total hip arthroplasty dislocation, irreducible  2.  Previous history of complex left hip reconstruction with history of instability.    POSTOPERATIVE DIAGNOSIS:   1.  Left total hip arthroplasty dislocation, irreducible  2.  Previous history of complex left hip reconstruction with history of instability.    PROCEDURE PERFORMED: LEFT HIP CLOSED REDUCTION    ANESTHESIA: General      ESTIMATED BLOOD LOSS: None   SPECIMENS: Nil.   COMPLICATIONS: Nil.   DRAINS: Nil.   SURGEON: Shant Cook M.D.  ASSISTANTS:  None    INDICATIONS: 73 y.o. female admitted to [facility] with Dislocated hip, left, initial encounter.      Today patient was able to get to the shower and was trying to get something from underneath her bed and in that process to use her leg to get the stuff out and heard a pop and afterwards she was in pain and discomfort.  Her girlfriend called the EMS and she was eventually taken to [facility] emergency room where she was found to have dislocation of her left hip.    Dr. Judge in the emergency room tried a closed reduction but was unsuccessful.  They have called our office who contacted me to see if I can evaluate and manage the patient.  I have recalled the emergency room and I have requested Dr. Judge to transfer the patient to [facility] for further evaluation and management.  Secondary to multiple comorbidities I have requested the hospitalist to admit.     She was transferred in late to the hospital.     Patient is a 73-year-old female who has history of severe deforming rheumatoid arthritis and has had left hip total arthroplasty long time ago by Dr. Mancini.  Since then patient has multiple issues and dislocations  requiring various type of surgeries.  Her last dislocation was 7 years ago and she has done well since then until 2 weeks ago when she started having increasing pain and discomfort in her hips.  She saw her rheumatologist and was diagnosed with flare of her rheumatoid arthritis.  She was given some steroids.  Patient in the interim was able to ambulate and bear weight on her lower extremities albeit with difficulty.      She is accompanied by her significant other to the hospital visit.  She reports experiencing left leg being longer chronically.       She was evaluated and dislocation was confirmed.   Options were discussed in detail with the patient and her significant other. The patient was indicated for a closed reduction.  Likely risks and benefits of the procedure including, but not limited to inability to reduce, periprosthetic fractures, redislocation,  possibility of injury to nerves, vessels  have been discussed in detail. Despite the risks involved, the  elected to proceed and informed consent was obtained and was scheduled for procedure. The patient was seen in the preoperative holding area and the operative site was marked.      PROCEDURE: The patient has been transferred to  operating room. After achieving adequate anesthesia, Surgical timeout was done. Correct patient surgical side and site were identified.     The patient was placed onto the [place] table.  The left hip was flexed and adducted and internally rotated.  With gentle traction and image intensifier control the hip was closed reduced. The hip was stable for range of motion.    I was unable to feel the normal reduction of the head ball into the socket.      The patient tolerated the procedure well. The reduction was confirmed under multiple image intensifier views. An abduction pillow is being placed.     We will mobilize her with physical therapy and see if she is able to go home today or tomorrow.    I was able to  review her records.  The operative reports were from our office.    She underwent a revision of her left total hip arthroplasty in July 2009 with a Jermaine tantalum cup and a protrusio cage and a cemented polyethylene liner.  This was a 56 tantalum cup and a cemented 46 polyethylene liner with 28 mm internal diameter.  Anteverted neck +3 mm offset head.     Apparently she continued to have some dislocations and she was revised in January 2010 to a +7 head length.  She has done well over the past 7 to 8 years.     The fact that she has been having stiffness for the past 2 weeks, Indicates the possibility that she might be having significant polyethylene wear/failure of the polyethylene liner.  I do not think that she has any loosening of the implants from the underlying bone.     I do believe eventually she will need a revision of the left total hip arthroplasty.  Several options will be available basically to do a liner change.  Possibility of a dual mobility cup/constrained ring will be considered.    6/8/2019  9:55 AM  Shant Cook MD    CC: Ramiro Briones MD; MD Shane Brito Lyle E, MD

## 2019-06-08 NOTE — ANESTHESIA PREPROCEDURE EVALUATION
Anesthesia Evaluation     Patient summary reviewed and Nursing notes reviewed   history of anesthetic complications: PONV  NPO Solid Status: > 8 hours             Airway   Mallampati: II  TM distance: >3 FB  Neck ROM: full  No difficulty expected  Dental - normal exam     Pulmonary - normal exam   (+) COPD, asthma,   Cardiovascular - normal exam  Exercise tolerance: good (4-7 METS)    Rhythm: regular    (+) pacemaker pacemaker interrogated <1 month ago, hypertension, dysrhythmias, hyperlipidemia,     ROS comment: · Calculated right ventricular systolic pressure from tricuspid regurgitation is 25.5 mmHg.  · Estimated EF = 53%.  · Left ventricular systolic function is normal.  · Left ventricular wall thickness is consistent with borderline concentric hypertrophy.    Neuro/Psych- negative ROS  GI/Hepatic/Renal/Endo    (+)  GERD,  hypothyroidism,     Musculoskeletal     Abdominal  - normal exam    Bowel sounds: normal.   Substance History - negative use     OB/GYN negative ob/gyn ROS         Other   (+) arthritis   history of cancer      Other Comment: RA                  Anesthesia Plan    ASA 3     general   (Surgeon requests ETT & short acting paralytic)  intravenous induction     Plan discussed with CRNA and attending.

## 2019-06-09 ENCOUNTER — APPOINTMENT (OUTPATIENT)
Dept: GENERAL RADIOLOGY | Facility: HOSPITAL | Age: 74
End: 2019-06-09

## 2019-06-09 VITALS
BODY MASS INDEX: 31.23 KG/M2 | HEIGHT: 61 IN | SYSTOLIC BLOOD PRESSURE: 102 MMHG | WEIGHT: 165.4 LBS | TEMPERATURE: 97.8 F | DIASTOLIC BLOOD PRESSURE: 61 MMHG | RESPIRATION RATE: 16 BRPM | HEART RATE: 72 BPM | OXYGEN SATURATION: 95 %

## 2019-06-09 PROCEDURE — G0378 HOSPITAL OBSERVATION PER HR: HCPCS

## 2019-06-09 PROCEDURE — 63710000001 HYDROCODONE-ACETAMINOPHEN 7.5-325 MG TABLET: Performed by: HOSPITALIST

## 2019-06-09 PROCEDURE — 63710000001 FOLIC ACID 1 MG TABLET: Performed by: INTERNAL MEDICINE

## 2019-06-09 PROCEDURE — 63710000001 METOPROLOL SUCCINATE XL 25 MG TABLET SUSTAINED-RELEASE 24 HOUR: Performed by: INTERNAL MEDICINE

## 2019-06-09 PROCEDURE — 73610 X-RAY EXAM OF ANKLE: CPT

## 2019-06-09 PROCEDURE — 97162 PT EVAL MOD COMPLEX 30 MIN: CPT

## 2019-06-09 PROCEDURE — A9270 NON-COVERED ITEM OR SERVICE: HCPCS | Performed by: INTERNAL MEDICINE

## 2019-06-09 PROCEDURE — 63710000001: Performed by: INTERNAL MEDICINE

## 2019-06-09 PROCEDURE — 94799 UNLISTED PULMONARY SVC/PX: CPT

## 2019-06-09 PROCEDURE — 63710000001 LEVOTHYROXINE 75 MCG TABLET: Performed by: INTERNAL MEDICINE

## 2019-06-09 PROCEDURE — 63710000001 ASPIRIN 325 MG TABLET DELAYED-RELEASE: Performed by: ORTHOPAEDIC SURGERY

## 2019-06-09 PROCEDURE — A9270 NON-COVERED ITEM OR SERVICE: HCPCS | Performed by: HOSPITALIST

## 2019-06-09 PROCEDURE — 63710000001 PANTOPRAZOLE 40 MG TABLET DELAYED-RELEASE: Performed by: INTERNAL MEDICINE

## 2019-06-09 PROCEDURE — 97110 THERAPEUTIC EXERCISES: CPT

## 2019-06-09 PROCEDURE — A9270 NON-COVERED ITEM OR SERVICE: HCPCS | Performed by: ORTHOPAEDIC SURGERY

## 2019-06-09 PROCEDURE — 63710000001 SERTRALINE 100 MG TABLET: Performed by: INTERNAL MEDICINE

## 2019-06-09 RX ADMIN — FOLIC ACID 1 MG: 1 TABLET ORAL at 08:40

## 2019-06-09 RX ADMIN — METOPROLOL SUCCINATE 25 MG: 25 TABLET, FILM COATED, EXTENDED RELEASE ORAL at 08:40

## 2019-06-09 RX ADMIN — ASPIRIN 325 MG: 325 TABLET, COATED ORAL at 08:40

## 2019-06-09 RX ADMIN — BUDESONIDE AND FORMOTEROL FUMARATE DIHYDRATE 2 PUFF: 80; 4.5 AEROSOL RESPIRATORY (INHALATION) at 09:27

## 2019-06-09 RX ADMIN — HYDROCODONE BITARTRATE AND ACETAMINOPHEN 1 TABLET: 7.5; 325 TABLET ORAL at 08:41

## 2019-06-09 RX ADMIN — SODIUM CHLORIDE 100 ML/HR: 9 INJECTION, SOLUTION INTRAVENOUS at 03:54

## 2019-06-09 RX ADMIN — HYDROCODONE BITARTRATE AND ACETAMINOPHEN 1 TABLET: 7.5; 325 TABLET ORAL at 15:01

## 2019-06-09 RX ADMIN — HYDROCODONE BITARTRATE AND ACETAMINOPHEN 1 TABLET: 7.5; 325 TABLET ORAL at 03:36

## 2019-06-09 RX ADMIN — PANTOPRAZOLE SODIUM 40 MG: 40 TABLET, DELAYED RELEASE ORAL at 08:40

## 2019-06-09 RX ADMIN — LEVOTHYROXINE SODIUM 75 MCG: 75 TABLET ORAL at 06:48

## 2019-06-09 RX ADMIN — CALCIUM CARBONATE 625 MG: 1250 TABLET ORAL at 08:41

## 2019-06-09 RX ADMIN — SERTRALINE 100 MG: 100 TABLET, FILM COATED ORAL at 08:40

## 2019-06-09 NOTE — PLAN OF CARE
Problem: Patient Care Overview  Goal: Plan of Care Review  Outcome: Ongoing (interventions implemented as appropriate)   06/09/19 0050   Coping/Psychosocial   Plan of Care Reviewed With patient   Plan of Care Review   Progress improving   OTHER   Outcome Summary vss, neurovascular status wnl, voiding well, reports adequate pain control, weaning off oxygen, discussed importance of monitoring b/p due to hx hypertension, possible discharge today, will continue to monitor     Goal: Individualization and Mutuality  Outcome: Ongoing (interventions implemented as appropriate)    Goal: Discharge Needs Assessment  Outcome: Ongoing (interventions implemented as appropriate)    Goal: Interprofessional Rounds/Family Conf  Outcome: Ongoing (interventions implemented as appropriate)      Problem: Fall Risk (Adult)  Goal: Absence of Fall  Outcome: Ongoing (interventions implemented as appropriate)      Problem: Pain, Acute (Adult)  Goal: Acceptable Pain Control/Comfort Level  Outcome: Ongoing (interventions implemented as appropriate)      Problem: Skin Injury Risk (Adult)  Goal: Skin Health and Integrity  Outcome: Ongoing (interventions implemented as appropriate)

## 2019-06-09 NOTE — PROGRESS NOTES
"DAILY PROGRESS NOTE  [facility]    Patient Identification:  Name: Barbara Segovia  Age: 73 y.o.  Sex: female  :  1945  MRN: [id number]         Primary Care Physician: Ramiro Briones MD    Subjective:  Interval History:She complains of left ankle pain.    Objective:    Scheduled Meds:    aspirin 325 mg Oral Daily   budesonide-formoterol 2 puff Inhalation BID - RT   calcium carbonate 625 mg Oral BID With Meals   folic acid 1 mg Oral Daily   hydroxychloroquine 200 mg Oral Q PM   levothyroxine 75 mcg Oral Q AM   metoprolol succinate XL 25 mg Oral Daily   pantoprazole 40 mg Oral Daily   sertraline 100 mg Oral Daily   sodium chloride 3 mL Intravenous Q12H   Tofacitinib Citrate 5 mg Oral BID   [START ON 6/10/2019] vitamin B-12 500 mcg Oral Once per day on    vitamin D 50,000 Units Oral Q14 Days     Continuous Infusions:    sodium chloride 100 mL/hr Last Rate: 100 mL/hr (19 0815)       Vital signs in last 24 hours:  Temp:  [96.6 °F (35.9 °C)-98.6 °F (37 °C)] 96.7 °F (35.9 °C)  Heart Rate:  [] 71  Resp:  [14-20] 16  BP: ()/(59-70) 118/59    Intake/Output:    Intake/Output Summary (Last 24 hours) at 2019 1009  Last data filed at 2019 0908  Gross per 24 hour   Intake 3159.95 ml   Output 200 ml   Net 2959.95 ml       Exam:  /59 (BP Location: Right arm, Patient Position: Lying)   Pulse 71   Temp 96.7 °F (35.9 °C) (Oral)   Resp 16   Ht 154.9 cm (60.98\")   Wt 75 kg (165 lb 6.4 oz)   SpO2 94%   BMI 31.27 kg/m²     General Appearance:    Sleepy, cooperative, no distress   Head:    Normocephalic, without obvious abnormality, atraumatic   Eyes:       Throat:   Lips, tongue, gums normal   Neck:   Supple, symmetrical, trachea midline, no JVD   Lungs:     Clear to auscultation bilaterally, respirations unlabored   Chest Wall:    No tenderness or deformity    Heart:    Regular rate and rhythm, S1 and S2 normal, no murmur,no  Rub or gallop   Abdomen:     Soft, " non-tender, bowel sounds active, no masses, no organomegaly    Extremities:   Extremities normal, atraumatic, no cyanosis or edema   Pulses:      Skin:   Skin is warm and dry,  no rashes or palpable lesions   Neurologic:   no focal deficits noted      Lab Results (last 72 hours)     Procedure Component Value Units Date/Time    Troponin [[id number]]  (Normal) Collected:  06/08/19 0311    Specimen:  Blood Updated:  06/08/19 0350     Troponin T <0.010 ng/mL     Narrative:       Troponin T Reference Range:  <= 0.03 ng/mL-   Negative for AMI  >0.03 ng/mL-     Abnormal for myocardial necrosis.  Clinicians would have to utilize clinical acumen, EKG, Troponin and serial changes to determine if it is an Acute Myocardial Infarction or myocardial injury due to an underlying chronic condition.     Comprehensive Metabolic Panel [[id number]]  (Abnormal) Collected:  06/08/19 0311    Specimen:  Blood Updated:  06/08/19 0346     Glucose 108 mg/dL      BUN 17 mg/dL      Creatinine 0.87 mg/dL      Sodium 137 mmol/L      Potassium 4.4 mmol/L      Chloride 103 mmol/L      CO2 23.0 mmol/L      Calcium 9.2 mg/dL      Total Protein 6.7 g/dL      Albumin 3.80 g/dL      ALT (SGPT) 14 U/L      AST (SGOT) 25 U/L      Alkaline Phosphatase 74 U/L      Total Bilirubin 0.4 mg/dL      eGFR Non African Amer 64 mL/min/1.73      Globulin 2.9 gm/dL      A/G Ratio 1.3 g/dL      BUN/Creatinine Ratio 19.5     Anion Gap 11.0 mmol/L     Narrative:       GFR Normal >60  Chronic Kidney Disease <60  Kidney Failure <15    Hemoglobin A1c [[id number]]  (Normal) Collected:  06/08/19 0311    Specimen:  Blood Updated:  06/08/19 0332     Hemoglobin A1C 5.10 %     Narrative:       Hemoglobin A1C Ranges:    Increased Risk for Diabetes  5.7% to 6.4%  Diabetes                     >= 6.5%  Diabetic Goal                < 7.0%    CBC Auto Differential [[id number]]  (Abnormal) Collected:  06/08/19 0311    Specimen:  Blood Updated:  06/08/19 0326     WBC 7.72 10*3/mm3      RBC  3.52 10*6/mm3      Hemoglobin 11.3 g/dL      Hematocrit 35.4 %      .6 fL      MCH 32.1 pg      MCHC 31.9 g/dL      RDW 12.3 %      RDW-SD 46.0 fl      MPV 9.5 fL      Platelets 179 10*3/mm3      Neutrophil % 87.3 %      Lymphocyte % 3.9 %      Monocyte % 8.0 %      Eosinophil % 0.0 %      Basophil % 0.3 %      Immature Grans % 0.5 %      Neutrophils, Absolute 6.74 10*3/mm3      Lymphocytes, Absolute 0.30 10*3/mm3      Monocytes, Absolute 0.62 10*3/mm3      Eosinophils, Absolute 0.00 10*3/mm3      Basophils, Absolute 0.02 10*3/mm3      Immature Grans, Absolute 0.04 10*3/mm3      nRBC 0.0 /100 WBC         Data Review:  Results from last 7 days   Lab Units 06/08/19  0311   SODIUM mmol/L 137   POTASSIUM mmol/L 4.4   CHLORIDE mmol/L 103   CO2 mmol/L 23.0   BUN mg/dL 17   CREATININE mg/dL 0.87   GLUCOSE mg/dL 108*   CALCIUM mg/dL 9.2     Results from last 7 days   Lab Units 06/08/19  0311   WBC 10*3/mm3 7.72   HEMOGLOBIN g/dL 11.3*   HEMATOCRIT % 35.4   PLATELETS 10*3/mm3 179         Results from last 7 days   Lab Units 06/08/19  0311   HEMOGLOBIN A1C % 5.10     Lab Results   Lab Value Date/Time    TROPONINT <0.010 06/08/2019 0311    TROPONINT <0.010 07/13/2018 1219         Results from last 7 days   Lab Units 06/08/19  0311   ALK PHOS U/L 74   BILIRUBIN mg/dL 0.4   ALT (SGPT) U/L 14   AST (SGOT) U/L 25         Results from last 7 days   Lab Units 06/08/19  0311   HEMOGLOBIN A1C % 5.10     No results found for: POCGLU        Past Medical History:   Diagnosis Date   • Arthritis     rheumatoid arthritis   • Asthma    • Cancer (CMS/HCC)     basal cell skin cancer   • COPD (chronic obstructive pulmonary disease) (CMS/HCC)    • Disease of thyroid gland    • Elevated cholesterol    • GERD (gastroesophageal reflux disease)    • History of transfusion    • Hypertension    • PONV (postoperative nausea and vomiting)    • Third degree heart block (CMS/HCC)        Assessment:  Active Hospital Problems    Diagnosis  POA   •  **Dislocated hip, left, initial encounter (CMS/[facility]) [S73.005A]  Yes   • HTN (hypertension) [I10]  Yes   • Hypothyroid [E03.9]  Yes   • Rheumatoid arthritis (CMS/[facility]) [M06.9]  Yes   • COPD (chronic obstructive pulmonary disease) (CMS/[facility]) [J44.9]  Yes      Resolved Hospital Problems    Diagnosis Date Resolved POA   • Dislocated hip, right, initial encounter (CMS/[facility]) [S73.004A] 06/08/2019 Yes       Plan:   Hopefully will feel well enough to go home later today. Get XR left ankle.  Discussed with nurse.    Bishop Lynn MD  6/9/2019  10:09 AM

## 2019-06-09 NOTE — PROGRESS NOTES
Continued Stay Note  [facility]     Patient Name: Barbara Segovia  MRN: [id number]  Today's Date: 6/9/2019    Admit Date: 6/7/2019    Discharge Plan     Row Name 06/09/19 1700       Plan    Plan  Home with significant other     Plan Comments  Per Toyin with [facility], patient denied wanting/needing home health at discharge.  Patient discharged home with significant other.... Kirsten Mcgowan RN,CCP     Final Discharge Disposition Code  01 - home or self-care        Discharge Codes    No documentation.       Expected Discharge Date and Time     Expected Discharge Date Expected Discharge Time    Jun 9, 2019             Kirsten Mcgowan RN

## 2019-06-09 NOTE — DISCHARGE SUMMARY
PHYSICIAN DISCHARGE SUMMARY                                                                        [facility]    Patient Identification:  Name: Barbara Segovia  Age: 73 y.o.  Sex: female  :  1945  MRN: [id number]  Primary Care Physician: Ramiro Briones MD    Admit date: 2019  Discharge date and time:2019  Discharged Condition: good    Discharge Diagnoses:  Active Hospital Problems    Diagnosis  POA   • **Dislocated hip, left, initial encounter (CMS/[facility]) [S73.005A]  Yes   • HTN (hypertension) [I10]  Yes   • Hypothyroid [E03.9]  Yes   • Rheumatoid arthritis (CMS/[facility]) [M06.9]  Yes   • COPD (chronic obstructive pulmonary disease) (CMS/[facility]) [J44.9]  Yes      Resolved Hospital Problems    Diagnosis Date Resolved POA   • Dislocated hip, right, initial encounter (CMS/[facility]) [S73.004A] 2019 Yes          PMHX:   Past Medical History:   Diagnosis Date   • Arthritis     rheumatoid arthritis   • Asthma    • Cancer (CMS/[facility])     basal cell skin cancer   • COPD (chronic obstructive pulmonary disease) (CMS/[facility])    • Disease of thyroid gland    • Elevated cholesterol    • GERD (gastroesophageal reflux disease)    • History of transfusion    • Hypertension    • PONV (postoperative nausea and vomiting)    • Third degree heart block (CMS/[facility])      PSHX:   Past Surgical History:   Procedure Laterality Date   • CARDIAC ELECTROPHYSIOLOGY PROCEDURE N/A 2018    Procedure: Pacemaker DC new;  Surgeon: Duke Castillo MD;  Location: [facility] INVASIVE LOCATION;  Service: Cardiovascular   • EYE SURGERY     • FOOT SURGERY Right     hammer toe   • HAND SURGERY Right    • HYSTERECTOMY     • JOINT REPLACEMENT     • PACEMAKER IMPLANTATION  2018       Hospital Course: Barbara Segovia  is a 73-year-old female who has history of severe deforming rheumatoid arthritis and has had left hip total arthroplasty long time ago by   [facility].  Since then patient has multiple issues and dislocations requiring various type of surgeries.  Her last dislocation was 7 years ago and she has done well since then until 2 weeks ago when she started having increasing pain and discomfort in her hips.  She saw her rheumatologist and was diagnosed with flare of her rheumatoid arthritis.  She was given some steroids.  Patient in the interim was able to ambulate and bear weight on her lower extremities albeit with difficulty.  In that background today patient was able to get to the shower and was trying to get something from underneath her bed and in that process to use her leg to get the stuff out and heard a pop and afterwards she was in pain and discomfort.  Her girlfriend called the EMS and she was eventually taken to [facility] emergency room where she was found to have dislocation of her left hip.  Her case was discussed with orthopedic surgeon .    The patient was admitted for direct admission for further treatment.         The patient was admitted to hospital underwent general anesthetic for reduction of hip dislocation.  Postoperatively she did pretty well and was up moving around and felt well enough to go home the next day.  She also had some swelling and pain in her left ankle and plain x-rays were negative.  She will follow-up with her primary care doctor in 1 week for continuing care and also follow-up with orthopedic surgery.  Orthopedic surgery felt that she may need to have hip revision at some point in the future since she has had recurring dislocations.            Consults:     Consults     Date and Time Order Name Status Description    6/7/2019 2336 Inpatient Orthopedic Surgery Consult Completed         Results from last 7 days   Lab Units 06/08/19  0311   WBC 10*3/mm3 7.72   HEMOGLOBIN g/dL 11.3*   HEMATOCRIT % 35.4   PLATELETS 10*3/mm3 179     Results from last 7 days   Lab Units 06/08/19  0311   SODIUM mmol/L 137   POTASSIUM  mmol/L 4.4   CHLORIDE mmol/L 103   CO2 mmol/L 23.0   BUN mg/dL 17   CREATININE mg/dL 0.87   GLUCOSE mg/dL 108*   CALCIUM mg/dL 9.2     Significant Diagnostic Studies:   Lab Results   Component Value Date    WBC 7.72 06/08/2019    HGB 11.3 (L) 06/08/2019    HCT 35.4 06/08/2019     06/08/2019     Lab Results   Component Value Date     06/08/2019    K 4.4 06/08/2019     06/08/2019    CO2 23.0 06/08/2019    BUN 17 06/08/2019    CREATININE 0.87 06/08/2019    GLUCOSE 108 (H) 06/08/2019     Lab Results   Component Value Date    CALCIUM 9.2 06/08/2019     Lab Results   Component Value Date    AST 25 06/08/2019    ALT 14 06/08/2019    ALKPHOS 74 06/08/2019     No results found for: APTT, INR  No results found for: COLORU, CLARITYU, SPECGRAV, PHUR, PROTEINUR, GLUCOSEU, KETONESU, BLOODU, NITRITE, LEUKOCYTESUR, BILIRUBINUR, UROBILINOGEN, RBCUA, WBCUA, BACTERIA, UACOMMENT  Lab Results   Component Value Date    TROPONINT <0.010 06/08/2019     No components found for: HGBA1C;2  No components found for: TSH;2  Imaging Results (all)     Procedure Component Value Units Date/Time    XR Ankle 3+ View Left [[id number]] Collected:  06/09/19 1243     Updated:  06/09/19 1248    Narrative:       XR ANKLE 3+ VW LEFT-     INDICATIONS: Pain in the ankle     TECHNIQUE: 4 VIEWS OF THE LEFT ANKLE     COMPARISON: None available     FINDINGS:      No acute fracture is identified. No dislocation is noted. Mortise  appears preserved. Degenerative changes are seen at the intertarsal  joints. Arterial calcification is present. Slight soft tissue swelling  about the ankle.       Impression:          No acute fracture is identified. Follow-up/further evaluation can be  obtained as indications persist.           This report was finalized on 6/9/2019 12:45 PM by Dr. Jayce Salcedo M.D.       XR Hip With or Without Pelvis 2 - 3 View Left [[id number]] Collected:  06/08/19 1014     Updated:  06/08/19 1019    Narrative:       XR HIP W OR  WO PELVIS 2-3 VIEW LEFT-     INDICATIONS: Radiation dose reduction techniques were utilized,  including automated exposure control and exposure modulation based on  body size.     TECHNIQUE: FRONTAL VIEW THE PELVIS, VIEWS OF THE LEFT HIP     COMPARISON: 12/31/2009     FINDINGS:      Stable appearing bilateral hip arthroplasty hardware, no acute fracture  or dislocation is noted, with chronic left protrusio apparent.       Impression:          As described.     This report was finalized on 6/8/2019 10:16 AM by Dr. Jayce Salcedo M.D.       XR Hip With or Without Pelvis 1 View Left [[id number]] Collected:  06/08/19 0920     Updated:  06/08/19 0925    Narrative:       XR HIP W OR WO PELVIS 1 VIEW LEFT-, FL C ARM DURING SURGERY-     INDICATIONS: Closed reduction     TECHNIQUE: FLUOROSCOPIC ASSISTANCE IN THE OPERATING ROOM.     COMPARISON: 12/31/2009     FINDINGS:     2 intraoperative fluoroscopic spot views were obtained and recorded the  PACS for review, revealing properly located left hip arthroplasty  hardware, with acetabular augmentation.. Please see operative report for  full details.     Fluoroscopy time: 20 seconds       Impression:          As described.     This report was finalized on 6/8/2019 9:22 AM by Dr. Jayce Salcedo M.D.       FL C Arm During Surgery [[id number]] Collected:  06/08/19 0920     Updated:  06/08/19 0925    Narrative:       XR HIP W OR WO PELVIS 1 VIEW LEFT-, FL C ARM DURING SURGERY-     INDICATIONS: Closed reduction     TECHNIQUE: FLUOROSCOPIC ASSISTANCE IN THE OPERATING ROOM.     COMPARISON: 12/31/2009     FINDINGS:     2 intraoperative fluoroscopic spot views were obtained and recorded the  PACS for review, revealing properly located left hip arthroplasty  hardware, with acetabular augmentation.. Please see operative report for  full details.     Fluoroscopy time: 20 seconds       Impression:          As described.     This report was finalized on 6/8/2019 9:22 AM by Dr. Eduardo  KALEY Salcedo M.D.           Lab Results (last 7 days)     Procedure Component Value Units Date/Time    Troponin [[id number]]  (Normal) Collected:  06/08/19 0311    Specimen:  Blood Updated:  06/08/19 0350     Troponin T <0.010 ng/mL     Narrative:       Troponin T Reference Range:  <= 0.03 ng/mL-   Negative for AMI  >0.03 ng/mL-     Abnormal for myocardial necrosis.  Clinicians would have to utilize clinical acumen, EKG, Troponin and serial changes to determine if it is an Acute Myocardial Infarction or myocardial injury due to an underlying chronic condition.     Comprehensive Metabolic Panel [[id number]]  (Abnormal) Collected:  06/08/19 0311    Specimen:  Blood Updated:  06/08/19 0346     Glucose 108 mg/dL      BUN 17 mg/dL      Creatinine 0.87 mg/dL      Sodium 137 mmol/L      Potassium 4.4 mmol/L      Chloride 103 mmol/L      CO2 23.0 mmol/L      Calcium 9.2 mg/dL      Total Protein 6.7 g/dL      Albumin 3.80 g/dL      ALT (SGPT) 14 U/L      AST (SGOT) 25 U/L      Alkaline Phosphatase 74 U/L      Total Bilirubin 0.4 mg/dL      eGFR Non African Amer 64 mL/min/1.73      Globulin 2.9 gm/dL      A/G Ratio 1.3 g/dL      BUN/Creatinine Ratio 19.5     Anion Gap 11.0 mmol/L     Narrative:       GFR Normal >60  Chronic Kidney Disease <60  Kidney Failure <15    Hemoglobin A1c [[id number]]  (Normal) Collected:  06/08/19 0311    Specimen:  Blood Updated:  06/08/19 0332     Hemoglobin A1C 5.10 %     Narrative:       Hemoglobin A1C Ranges:    Increased Risk for Diabetes  5.7% to 6.4%  Diabetes                     >= 6.5%  Diabetic Goal                < 7.0%    CBC Auto Differential [[id number]]  (Abnormal) Collected:  06/08/19 0311    Specimen:  Blood Updated:  06/08/19 0326     WBC 7.72 10*3/mm3      RBC 3.52 10*6/mm3      Hemoglobin 11.3 g/dL      Hematocrit 35.4 %      .6 fL      MCH 32.1 pg      MCHC 31.9 g/dL      RDW 12.3 %      RDW-SD 46.0 fl      MPV 9.5 fL      Platelets 179 10*3/mm3      Neutrophil % 87.3 %       "Lymphocyte % 3.9 %      Monocyte % 8.0 %      Eosinophil % 0.0 %      Basophil % 0.3 %      Immature Grans % 0.5 %      Neutrophils, Absolute 6.74 10*3/mm3      Lymphocytes, Absolute 0.30 10*3/mm3      Monocytes, Absolute 0.62 10*3/mm3      Eosinophils, Absolute 0.00 10*3/mm3      Basophils, Absolute 0.02 10*3/mm3      Immature Grans, Absolute 0.04 10*3/mm3      nRBC 0.0 /100 WBC         /61 (BP Location: Right arm, Patient Position: Sitting)   Pulse 72   Temp 97.8 °F (36.6 °C) (Oral)   Resp 16   Ht 154.9 cm (60.98\")   Wt 75 kg (165 lb 6.4 oz)   SpO2 95%   BMI 31.27 kg/m²     Discharge Exam:  General Appearance:    Alert, cooperative, no distress                          Head:    Normocephalic, without obvious abnormality, atraumatic                          Eyes:                            Throat:   Lips, tongue, gums normal                          Neck:   Supple, symmetrical, trachea midline, no JVD                        Lungs:     Clear to auscultation bilaterally, respirations unlabored                Chest Wall:    No tenderness or deformity                        Heart:    Regular rate and rhythm, S1 and S2 normal, no murmur,no  Rub or gallop                  Abdomen:     Soft, non-tender, bowel sounds active, no masses, no organomegaly                  Extremities:   Extremities normal, atraumatic, no cyanosis or edema                             Skin:   Skin is warm and dry,  no rashes or palpable lesions                  Neurologic:   no focal deficits noted     Disposition:  Home    Patient Instructions:      Discharge Medications      Changes to Medications      Instructions Start Date   levothyroxine 75 MCG tablet  Commonly known as:  SYNTHROID, LEVOTHROID  What changed:  Another medication with the same name was removed. Continue taking this medication, and follow the directions you see here.   75 mcg, Oral, Daily         Continue These Medications      Instructions Start Date   albuterol " sulfate  (90 Base) MCG/ACT inhaler  Commonly known as:  PROVENTIL HFA;VENTOLIN HFA;PROAIR HFA   2 puffs, Inhalation, Every 4 Hours PRN      aspirin 81 MG EC tablet   81 mg, Oral, Every Evening      benazepril 20 MG tablet  Commonly known as:  LOTENSIN   20 mg, Oral, Daily      calcium carbonate 600 MG tablet  Commonly known as:  OS-CANDIDO   900 mg, Oral, Every Evening      fluticasone-salmeterol 100-50 MCG/DOSE DISKUS  Commonly known as:  ADVAIR   Inhalation, 2 Times Daily PRN      folic acid 1 MG tablet  Commonly known as:  FOLVITE   1 mg, Oral, 2 Times Daily      HYDROcodone-acetaminophen 7.5-325 MG per tablet  Commonly known as:  NORCO   1 tablet, Oral, 2 Times Daily PRN      hydroxychloroquine 200 MG tablet  Commonly known as:  PLAQUENIL   200 mg, Oral, Every Evening      methotrexate 2.5 MG tablet   10 mg, Oral, Weekly, wednesdays       metoprolol succinate XL 25 MG 24 hr tablet  Commonly known as:  TOPROL-XL   25 mg, Oral, Daily      pantoprazole 40 MG EC tablet  Commonly known as:  PROTONIX   40 mg, Oral, Daily      raNITIdine 150 MG tablet  Commonly known as:  ZANTAC   150 mg, Oral, Nightly      sertraline 100 MG tablet  Commonly known as:  ZOLOFT   100 mg, Oral, Daily      vitamin B-12 500 MCG tablet  Commonly known as:  CYANOCOBALAMIN   500 mcg, Oral, Daily      vitamin B-12 500 MCG tablet  Commonly known as:  CYANOCOBALAMIN   500 mcg, Oral, 3 Times Weekly      vitamin D 42901 units capsule capsule  Commonly known as:  ERGOCALCIFEROL   50,000 Units, Oral, Every 14 Days      XELJANZ 5 MG tablet  Generic drug:  Tofacitinib Citrate   5 mg, Oral, 2 Times Daily           Future Appointments   Date Time Provider Department Center   8/22/2019 10:30 AM [facility] IN [facility]ICE, [facility] None   8/22/2019 11:00 AM Duke Castillo MD [facility] None     Follow-up Information     Ramiro Briones MD Follow up in 1 week(s).    Specialty:  Internal Medicine  Contact information:  [address] [place] [address] [address]  [facility] [address]  [phone]             Shant Cook MD Follow up.    Specialty:  Orthopedic Surgery  Contact information:  [address]  [place] [id number] [phone]                 Discharge Order (From admission, onward)    Start     Ordered    06/09/19 1337  Discharge patient  Once     Expected Discharge Date:  06/09/19    Discharge Disposition:  Home or Self Care    Physician of Record for Attribution - Please select from Treatment Team:  JASWANT LYNN [[id number]]    Review needed by CMO to determine Physician of Record:  No       Question Answer Comment   Physician of Record for Attribution - Please select from Treatment Team JASWANT LYNN    Review needed by CMO to determine Physician of Record No        06/09/19 1338          Total time spent discharging patient including evaluation,post hospitalization follow up,  medication and post hospitalization instructions and education total time exceeds 30 minutes.    Signed:  Jaswant Lynn MD  6/9/2019  1:39 PM

## 2019-06-09 NOTE — THERAPY EVALUATION
Acute Care - Physical Therapy Initial Evaluation  [facility]     Patient Name: Barbara Segovia  : 1945  MRN: [id number]  Today's Date: 2019   Onset of Illness/Injury or Date of Surgery: 19            Admit Date: 2019    Visit Dx:     ICD-10-CM ICD-9-CM   1. Difficulty walking R26.2 719.7     Patient Active Problem List   Diagnosis   • Third degree heart block (CMS/HCC)   • Diarrhea   • HTN (hypertension)   • Hypothyroid   • Rheumatoid arthritis (CMS/HCC)   • COPD (chronic obstructive pulmonary disease) (CMS/HCC)   • Dislocated hip, left, initial encounter (CMS/HCC)     Past Medical History:   Diagnosis Date   • Arthritis     rheumatoid arthritis   • Asthma    • Cancer (CMS/HCC)     basal cell skin cancer   • COPD (chronic obstructive pulmonary disease) (CMS/HCC)    • Disease of thyroid gland    • Elevated cholesterol    • GERD (gastroesophageal reflux disease)    • History of transfusion    • Hypertension    • PONV (postoperative nausea and vomiting)    • Third degree heart block (CMS/HCC)      Past Surgical History:   Procedure Laterality Date   • CARDIAC ELECTROPHYSIOLOGY PROCEDURE N/A 2018    Procedure: Pacemaker DC new;  Surgeon: Duke Castillo MD;  Location: [facility] INVASIVE LOCATION;  Service: Cardiovascular   • EYE SURGERY     • FOOT SURGERY Right     hammer toe   • HAND SURGERY Right    • HYSTERECTOMY     • JOINT REPLACEMENT     • PACEMAKER IMPLANTATION  2018        PT ASSESSMENT (last 12 hours)      Physical Therapy Evaluation     Row Name 19 0920          PT Evaluation Time/Intention    Subjective Information  complains of;pain  -CH     Document Type  evaluation  -CH     Mode of Treatment  physical therapy  -CH     Patient Effort  good  -CH     Symptoms Noted During/After Treatment  none  -CH     Row Name 19          General Information    Onset of Illness/Injury or Date of Surgery  19  -CH     Patient Observations   alert;cooperative;agree to therapy  -     Patient/Family Observations  pt sitting in chair, no acute distress noted at rest, some posterior lateral swelling noted in L ankle  -     Prior Level of Function  independent:;gait;transfer;bed mobility;ADL's walks with rollator  -     Equipment Currently Used at Home  walker, rolling;rollator  -     Pertinent History of Current Functional Problem  pt is post-op L hip closed reductions/p dislocation  -     Existing Precautions/Restrictions  fall;hip, posterior;left  -     Barriers to Rehab  medically complex  -     Row Name 06/09/19 0920          Relationship/Environment    Lives With  significant other  -     Row Name 06/09/19 0920          Resource/Environmental Concerns    Current Living Arrangements  home/apartment/condo  -[facility] Name 06/09/19 0920          Cognitive Assessment/Interventions    Additional Documentation  Cognitive Assessment/Intervention (Group)  -     Row Name 06/09/19 0920          Cognitive Assessment/Intervention- PT/OT    Orientation Status (Cognition)  oriented x 4  -     Follows Commands (Cognition)  WFL  -     Personal Safety Interventions  fall prevention program maintained;gait belt;nonskid shoes/slippers when out of bed  -[facility] Name 06/09/19 0920          Bed Mobility Assessment/Treatment    Bed Mobility Assessment/Treatment  supine-sit;sit-supine  -     Supine-Sit [place] (Bed Mobility)  not tested  -     Sit-Supine [place] (Bed Mobility)  not tested  [facility]     Comment (Bed Mobility)  sitting in chair  -     Row Name 06/09/19 0920          Transfer Assessment/Treatment    Transfer Assessment/Treatment  sit-stand transfer;stand-sit transfer  -     Sit-Stand [place] (Transfers)  verbal cues;nonverbal cues (demo/gesture);contact guard  -     Stand-Sit [place] (Transfers)  verbal cues;nonverbal cues (demo/gesture);contact guard  -[facility] Name 06/09/19 0920          Sit-Stand Transfer     Assistive Device (Sit-Stand Transfers)  walker, front-wheeled  -     Row Name 06/09/19 0920          Stand-Sit Transfer    Assistive Device (Stand-Sit Transfers)  walker, front-wheeled  -     Row Name 06/09/19 0920          Gait/Stairs Assessment/Training    [place] Level (Gait)  verbal cues;nonverbal cues (demo/gesture);contact guard  -     Assistive Device (Gait)  walker, front-wheeled  -     Distance in Feet (Gait)  70  -CH     Deviations/Abnormal Patterns (Gait)  garo decreased;gait speed decreased;stride length decreased;antalgic  -     Comment (Gait/Stairs)  pt with some hand and LE deformity from RA  -     Row Name 06/09/19 0920          General ROM    GENERAL ROM COMMENTS  B Knee and ankle ROM WFL for age, L hip flexion limited secondary to precautions  -     Row Name 06/09/19 0920          MMT (Manual Muscle Testing)    General MMT Comments  some L LE weakness noted with functional mobility and gait  -     Row Name 06/09/19 0920          Motor Assessment/Intervention    Additional Documentation  Balance (Group);Therapeutic Exercise (Group)  -     Row Name 06/09/19 0920          Therapeutic Exercise    Therapeutic Exercise  -- 10 reps L MICHAEL (excluding heel slides) protocol.  -     Additional Documentation  Therapeutic Exercise (Row)  -     Row Name 06/09/19 0920          Balance    Balance  static standing balance;dynamic standing balance  -     Row Name 06/09/19 0920          Static Standing Balance    Level of [place] (Supported Standing, Static Balance)  contact guard assist  -     Assistive Device Utilized (Supported Standing, Static Balance)  walker, rolling  -     Row Name 06/09/19 0920          Dynamic Standing Balance    Level of [place], Reaches Outside Midline (Standing, Dynamic Balance)  contact guard assist  -     Assistive Device Utilized (Supported Standing, Dynamic Balance)  walker, rolling  -     Row Name 06/09/19 0920          Pain  Assessment    Additional Documentation  Pain Scale: Numbers Pre/Post-Treatment (Group)  -     Row Name 06/09/19 0920          Pain Scale: Numbers Pre/Post-Treatment    Pain Scale: Numbers, Pretreatment  5/10  -     Pain Location - Side  Left  -     Pain Location  ankle  -     Pain Intervention(s)  Repositioned  -     Row Name 06/09/19 0920          Plan of Care Review    Plan of Care Reviewed With  patient  -[facility] Name 06/09/19 0920          Physical Therapy Clinical Impression    Patient/Family Goals Statement (PT Clinical Impression)  [facility] to [facility]  -     Criteria for Skilled Interventions Met (PT Clinical Impression)  treatment indicated  -     Impairments Found (describe specific impairments)  gait, locomotion, and balance;muscle performance  -     Rehab Potential (PT Clinical Summary)  good, to achieve stated therapy goals  -     Row Name 06/09/19 0920          Physical Therapy Goals    Bed Mobility Goal Selection (PT)  bed mobility, PT goal 1  -     Transfer Goal Selection (PT)  transfer, PT goal 1  -     Gait Training Goal Selection (PT)  gait training, PT goal 1  -[facility] Name 06/09/19 0920          Bed Mobility Goal 1 (PT)    Activity/Assistive Device (Bed Mobility Goal 1, PT)  bed mobility activities, all  -CH     [place] Level/Cues Needed (Bed Mobility Goal 1, PT)  supervision required  -     Time Frame (Bed Mobility Goal 1, PT)  1 week  -[facility] Name 06/09/19 0920          Transfer Goal 1 (PT)    Activity/Assistive Device (Transfer Goal 1, PT)  transfers, all;walker, rolling  -CH     [place] Level/Cues Needed (Transfer Goal 1, PT)  supervision required  -     Time Frame (Transfer Goal 1, PT)  1 week  -[facility] Name 06/09/19 0920          Gait Training Goal 1 (PT)    Activity/Assistive Device (Gait Training Goal 1, PT)  gait (walking locomotion);walker, rolling  -     [place] Level (Gait Training Goal 1, PT)  supervision required  -      Distance (Gait Goal 1, PT)  150  -     Time Frame (Gait Training Goal 1, PT)  1 week  -     Row Name 06/09/19 0920          Positioning and Restraints    Pre-Treatment Position  sitting in chair/recliner  -     Post Treatment Position  chair  -CH     In Chair  reclined;call light within reach;encouraged to call for assist  -       User Key  (r) = Recorded By, (t) = Taken By, (c) = Cosigned By    Initials Name Provider Type     Dayana Sanabria PT Physical Therapist        Physical Therapy Education     Title: PT OT SLP Therapies (Done)     Topic: Physical Therapy (Done)     Point: Mobility training (Done)     Learning Progress Summary           Patient Acceptance, E,TB,D, VU,NR by  at 6/9/2019 11:39 AM                   Point: Home exercise program (Done)     Learning Progress Summary           Patient Acceptance, E,TB,D, VU,NR by  at 6/9/2019 11:39 AM                   Point: Body mechanics (Done)     Learning Progress Summary           Patient Acceptance, E,TB,D, VU,NR by  at 6/9/2019 11:39 AM                   Point: Precautions (Done)     Learning Progress Summary           Patient Acceptance, E,TB,D, VU,NR by  at 6/9/2019 11:39 AM                               User Key     Initials Effective Dates Name Provider Type [facility] 04/03/18 -  Dayana Sanabria, PT Physical Therapist PT              PT Recommendation and Plan  Anticipated Discharge Disposition (PT): home with home health  Planned Therapy Interventions (PT Eval): balance training, bed mobility training, gait training, home exercise program, patient/family education, transfer training  Therapy Frequency (PT Clinical Impression): daily  Outcome Summary/Treatment Plan (PT)  Anticipated Discharge Disposition (PT): home with home health  Plan of Care Reviewed With: patient  Outcome Summary: Pt presents with impaired functional mobility and gait secondary to L LE pain, weakness, and decreased ROM s/p hip dislocation and  reduction. Pt may benefit from skilled PT to address strength, mobility, and gait.  Outcome Measures     Row Name 06/09/19 1100             How much help from another person do you currently need...    Turning from your back to your side while in flat bed without using bedrails?  3  -CH      Moving from lying on back to sitting on the side of a flat bed without bedrails?  3  -CH      Moving to and from a bed to a chair (including a wheelchair)?  3  -CH      Standing up from a chair using your arms (e.g., wheelchair, bedside chair)?  3  -CH      Climbing 3-5 steps with a railing?  2  -CH      To walk in hospital room?  3  -CH      AM-PAC 6 Clicks Score  17  -         Functional Assessment    Outcome Measure Options  AM-PAC 6 Clicks Basic Mobility (PT)  -        User Key  (r) = Recorded By, (t) = Taken By, (c) = Cosigned By    Initials Name Provider Type     Dayana Sanabria, PT Physical Therapist         Time Calculation:   PT Charges     Row Name 06/09/19 1140             Time Calculation    Start Time  0900  -      Stop Time  0920  -      Time Calculation (min)  20 min  -      PT Received On  06/09/19  -      PT - Next Appointment  06/10/19  -      PT Goal Re-Cert Due Date  06/16/19  -         Time Calculation- PT    Total Timed Code Minutes- PT  15 minute(s)  -        User Key  (r) = Recorded By, (t) = Taken By, (c) = Cosigned By    Initials Name Provider Type     Dayana Sanabria, PT Physical Therapist        Therapy Charges for Today     Code Description Service Date Service Provider Modifiers Qty    54241536223  PT EVAL MOD COMPLEXITY 2 6/9/2019 Dayana Sanabria, PT GP 1    [id number]  PT THER PROC EA 15 MIN 6/9/2019 Dayana Sanabria, PT GP 1          PT G-Codes  Outcome Measure Options: AM-PAC 6 Clicks Basic Mobility (PT)  AM-PAC 6 Clicks Score: 17      Dayana Sanabria PT  6/9/2019

## 2019-06-09 NOTE — PLAN OF CARE
Problem: Patient Care Overview  Goal: Plan of Care Review  Outcome: Ongoing (interventions implemented as appropriate)   06/09/19 9817   Coping/Psychosocial   Plan of Care Reviewed With patient   OTHER   Outcome Summary Pt presents with impaired functional mobility and gait secondary to L LE pain, weakness, and decreased ROM s/p hip dislocation and reduction. Pt may benefit from skilled PT to address strength, mobility, and gait.

## 2019-06-10 ENCOUNTER — READMISSION MANAGEMENT (OUTPATIENT)
Dept: CALL CENTER | Facility: HOSPITAL | Age: 74
End: 2019-06-10

## 2019-06-10 NOTE — OUTREACH NOTE
Prep Survey      Responses   Facility patient discharged from?  [place]   Is patient eligible?  Yes   Discharge diagnosis  Dislocated hip, left, initial encounter    Does the patient have one of the following disease processes/diagnoses(primary or secondary)?  General Surgery   Does the patient have Home health ordered?  No   What is the Home health agency?   pt declined need    Prep survey completed?  Yes          Isa Sood RN

## 2019-06-12 ENCOUNTER — READMISSION MANAGEMENT (OUTPATIENT)
Dept: CALL CENTER | Facility: HOSPITAL | Age: 74
End: 2019-06-12

## 2019-06-12 NOTE — OUTREACH NOTE
General Surgery Week 1 Survey      Responses   Facility patient discharged from?  [facility]   Does the patient have one of the following disease processes/diagnoses(primary or secondary)?  General Surgery   Is there a successful TCM telephone encounter documented?  No   Week 1 attempt successful?  Yes   Call start time  0940   Call end time  0949   Discharge diagnosis  Dislocated hip, left, initial encounter    Is patient permission given to speak with other caregiver?  Yes   List who call center can speak with  POA   Person spoke with today (if not patient) and relationship  POA   Meds reviewed with patient/caregiver?  Yes   Is the patient having any side effects they believe may be caused by any medication additions or changes?  No   Does the patient have all medications related to this admission filled (includes all antibiotics, pain medications, etc.)  N/A   Is the patient taking all medications as directed (includes completed medication regime)?  Yes   Does the patient have a follow up appointment scheduled with their surgeon?  Yes   Has the patient kept scheduled appointments due by today?  N/A   Psychosocial issues?  No   Comments  Left foot +edema, able to bare wt on foot, still has pain but the edema is great, unsure if can get shoe on foot. Thinks that she may have twisted ankle when hip dislocated.    Did the patient receive a copy of their discharge instructions?  Yes   Nursing interventions  Reviewed instructions with patient   What is the patient's perception of their health status since discharge?  Improving   Nursing interventions  Nurse provided patient education   Is the patient /caregiver able to teach back basic post-op care?  Continue use of incentive spirometry at least 1 week post discharge   Is the patient/caregiver able to teach back signs and symptoms of incisional infection?  Increased redness, swelling or pain at the incisonal site, Fever   Is the patient/caregiver able to teach back  steps to recovery at home?  Eat a well-balance diet, Set small, achievable goals for return to baseline health, Rest and rebuild strength, gradually increase activity   Is the patient/caregiver able to teach back the hierarchy of who to call/visit for symptoms/problems? PCP, Specialist, Home health nurse, Urgent Care, ED, 911  Yes   Week 1 call completed?  Yes   Revoked  No further contact(revokes)-requires comment   Graduated/Revoked comments  just waiting on edema to resolve but doing well otherwise, see no reason for further calls.           Migdalia Calles, RN

## 2019-07-16 RX ORDER — METOPROLOL SUCCINATE 25 MG/1
25 TABLET, EXTENDED RELEASE ORAL DAILY
Qty: 90 TABLET | Refills: 1 | Status: SHIPPED | OUTPATIENT
Start: 2019-07-16 | End: 2019-08-22

## 2019-08-22 ENCOUNTER — CLINICAL SUPPORT NO REQUIREMENTS (OUTPATIENT)
Dept: CARDIOLOGY | Facility: CLINIC | Age: 74
End: 2019-08-22

## 2019-08-22 ENCOUNTER — OFFICE VISIT (OUTPATIENT)
Dept: CARDIOLOGY | Facility: CLINIC | Age: 74
End: 2019-08-22

## 2019-08-22 VITALS
HEART RATE: 68 BPM | BODY MASS INDEX: 29.57 KG/M2 | SYSTOLIC BLOOD PRESSURE: 180 MMHG | DIASTOLIC BLOOD PRESSURE: 90 MMHG | OXYGEN SATURATION: 98 % | HEIGHT: 61 IN | WEIGHT: 156.6 LBS

## 2019-08-22 DIAGNOSIS — I44.2 THIRD DEGREE HEART BLOCK (HCC): Primary | ICD-10-CM

## 2019-08-22 DIAGNOSIS — I44.2 COMPLETE HEART BLOCK (HCC): Primary | ICD-10-CM

## 2019-08-22 DIAGNOSIS — I10 ESSENTIAL HYPERTENSION: ICD-10-CM

## 2019-08-22 PROCEDURE — 93280 PM DEVICE PROGR EVAL DUAL: CPT | Performed by: INTERNAL MEDICINE

## 2019-08-22 PROCEDURE — 99214 OFFICE O/P EST MOD 30 MIN: CPT | Performed by: INTERNAL MEDICINE

## 2019-08-22 RX ORDER — METOPROLOL SUCCINATE 50 MG/1
50 TABLET, EXTENDED RELEASE ORAL DAILY
Qty: 90 TABLET | Refills: 3 | Status: SHIPPED | OUTPATIENT
Start: 2019-08-22 | End: 2019-09-06 | Stop reason: SDUPTHER

## 2019-08-22 RX ORDER — LOSARTAN POTASSIUM 50 MG/1
50 TABLET ORAL DAILY
COMMUNITY

## 2019-08-22 NOTE — PROGRESS NOTES
Date of Office Visit: 2019    Patient Name: Barbara Segovia  : 1945    Encounter Provider: Duke Castillo MD  Referring Provider: No ref. provider found  Place of Service: [facility] CARDIOLOGY  Patient Care Team:  Ramiro Briones MD as PCP - General (Internal Medicine)      Heart block, hypertension    History of Present Illness  The patient is a 73-year-old white female with a history of complete heart block who is status post permanent pacemaker implant.  Her pacemaker was checked today and is working well.  She is also has hypertension has been on both losartan and metoprolol.  Her blood pressure today is severely elevated.  She does not unfortunately check her pressure on a regular basis.      Past Medical History:   Diagnosis Date   • Arthritis     rheumatoid arthritis   • Asthma    • Cancer (CMS/HCC)     basal cell skin cancer   • COPD (chronic obstructive pulmonary disease) (CMS/HCC)    • Disease of thyroid gland    • Elevated cholesterol    • GERD (gastroesophageal reflux disease)    • History of transfusion    • Hypertension    • PONV (postoperative nausea and vomiting)    • Third degree heart block (CMS/HCC)          Past Surgical History:   Procedure Laterality Date   • CARDIAC ELECTROPHYSIOLOGY PROCEDURE N/A 2018    Procedure: Pacemaker DC new;  Surgeon: Duke Castillo MD;  Location: [facility] INVASIVE LOCATION;  Service: Cardiovascular   • EYE SURGERY     • FOOT SURGERY Right     hammer toe   • HAND SURGERY Right    • HIP CLOSED REDUCTION Left 2019    Procedure: HIP CLOSED REDUCTION;  Surgeon: Shant Cook MD;  Location: [facility] OR;  Service: Orthopedics   • HYSTERECTOMY     • JOINT REPLACEMENT     • PACEMAKER IMPLANTATION  2018           Current Outpatient Medications:   •  albuterol (PROVENTIL HFA;VENTOLIN HFA) 108 (90 Base) MCG/ACT inhaler, Inhale 2 puffs Every 4 (Four) Hours As Needed for Wheezing.,  Disp: , Rfl:   •  aspirin 81 MG EC tablet, Take 81 mg by mouth Every Evening., Disp: , Rfl:   •  calcium carbonate (OS-CANDIDO) 600 MG tablet, Take 900 mg by mouth Every Evening., Disp: , Rfl:   •  fluticasone-salmeterol (ADVAIR) 100-50 MCG/DOSE DISKUS, Inhale 2 (Two) Times a Day As Needed., Disp: , Rfl:   •  folic acid (FOLVITE) 1 MG tablet, Take 1 mg by mouth 2 (Two) Times a Day., Disp: , Rfl:   •  HYDROcodone-acetaminophen (NORCO) 7.5-325 MG per tablet, Take 1 tablet by mouth 2 (Two) Times a Day As Needed for Moderate Pain ., Disp: , Rfl:   •  hydroxychloroquine (PLAQUENIL) 200 MG tablet, Take 200 mg by mouth Every Evening., Disp: , Rfl:   •  levothyroxine (SYNTHROID, LEVOTHROID) 75 MCG tablet, Take 75 mcg by mouth Daily., Disp: , Rfl:   •  losartan (COZAAR) 50 MG tablet, Take 50 mg by mouth Daily., Disp: , Rfl:   •  metoprolol succinate XL (TOPROL-XL) 50 MG 24 hr tablet, Take 1 tablet by mouth Daily., Disp: 90 tablet, Rfl: 3  •  pantoprazole (PROTONIX) 40 MG EC tablet, Take 40 mg by mouth Daily., Disp: , Rfl:   •  sertraline (ZOLOFT) 100 MG tablet, Take 100 mg by mouth Daily., Disp: , Rfl:   •  Tofacitinib Citrate (XELJANZ) 5 MG tablet, Take 5 mg by mouth 2 (Two) Times a Day., Disp: , Rfl:   •  vitamin B-12 (CYANOCOBALAMIN) 500 MCG tablet, Take 500 mcg by mouth Daily., Disp: , Rfl:   •  vitamin D (ERGOCALCIFEROL) 05788 units capsule capsule, Take 50,000 Units by mouth Every 14 (Fourteen) Days., Disp: , Rfl:       Social History     Socioeconomic History   • Marital status:      Spouse name: Not on file   • Number of children: Not on file   • Years of education: Not on file   • Highest education level: Not on file   Tobacco Use   • Smoking status: Never Smoker   • Tobacco comment: Patient reported that she does not smoke   Substance and Sexual Activity   • Alcohol use: No     Comment: no caffeine   • Drug use: No   • Sexual activity: Defer         Review of Systems   Constitution: Positive for malaise/fatigue.  "  HENT: Negative.    Eyes: Negative.    Cardiovascular: Positive for dyspnea on exertion and leg swelling.   Respiratory: Negative.    Endocrine: Negative.    Skin: Negative.    Musculoskeletal: Negative.    Gastrointestinal: Negative.    Neurological: Negative.    Psychiatric/Behavioral: Negative.        Procedures    Procedures        Objective:    /90 (BP Location: Right arm, Patient Position: Sitting, Cuff Size: Adult)   Pulse 68   Ht 154.9 cm (61\")   Wt 71 kg (156 lb 9.6 oz)   SpO2 98%   BMI 29.59 kg/m²         Physical Exam   Constitutional: She is oriented to person, place, and time. She appears well-developed and well-nourished.   HENT:   Head: Normocephalic.   Eyes: Pupils are equal, round, and reactive to light.   Neck: Normal range of motion. No JVD present. Carotid bruit is not present. No thyromegaly present.   Cardiovascular: Normal rate, regular rhythm, S1 normal, S2 normal, normal heart sounds and intact distal pulses. Exam reveals no gallop and no friction rub.   No murmur heard.  Pulmonary/Chest: Effort normal and breath sounds normal.   Abdominal: Soft. Bowel sounds are normal.   Musculoskeletal: She exhibits no edema.   Joint deformity secondary to arthritis   Neurological: She is alert and oriented to person, place, and time.   Skin: Skin is warm, dry and intact. No erythema.   Psychiatric: She has a normal mood and affect.   Vitals reviewed.          Assessment:       Diagnosis Plan   1. Third degree heart block (CMS/HCC)     2. Essential hypertension         1.  Complete heart block: Status post permanent pacemaker implant.  Normal function    2.  Hypertension: Uncontrolled.  Increase metoprolol succinate to 50 mg daily.  Continue losartan.  Advised the patient that she needs to check her blood pressure regularly.  She will hopefully call in with some blood pressure recordings in the next few weeks.     Plan:         "

## 2019-09-06 ENCOUNTER — TELEPHONE (OUTPATIENT)
Dept: CARDIOLOGY | Facility: CLINIC | Age: 74
End: 2019-09-06

## 2019-09-06 RX ORDER — METOPROLOL SUCCINATE 50 MG/1
50 TABLET, EXTENDED RELEASE ORAL DAILY
Qty: 90 TABLET | Refills: 3 | Status: SHIPPED | OUTPATIENT
Start: 2019-09-06 | End: 2020-08-06

## 2019-09-06 NOTE — TELEPHONE ENCOUNTER
9/6/19  Patient called to report that [facility] never received the Rx for the increased dose of Metoprolol.  I sent it through again/cam

## 2019-09-20 ENCOUNTER — OFFICE (AMBULATORY)
Dept: URBAN - METROPOLITAN AREA CLINIC 1 | Facility: CLINIC | Age: 74
End: 2019-09-20

## 2019-09-20 VITALS
HEART RATE: 65 BPM | WEIGHT: 158 LBS | HEIGHT: 61 IN | SYSTOLIC BLOOD PRESSURE: 138 MMHG | DIASTOLIC BLOOD PRESSURE: 61 MMHG

## 2019-09-20 DIAGNOSIS — K52.9 NONINFECTIVE GASTROENTERITIS AND COLITIS, UNSPECIFIED: ICD-10-CM

## 2019-09-20 DIAGNOSIS — K29.50 UNSPECIFIED CHRONIC GASTRITIS WITHOUT BLEEDING: ICD-10-CM

## 2019-09-20 DIAGNOSIS — R13.12 DYSPHAGIA, OROPHARYNGEAL PHASE: ICD-10-CM

## 2019-09-20 DIAGNOSIS — R11.2 NAUSEA WITH VOMITING, UNSPECIFIED: ICD-10-CM

## 2019-09-20 DIAGNOSIS — K21.9 GASTRO-ESOPHAGEAL REFLUX DISEASE WITHOUT ESOPHAGITIS: ICD-10-CM

## 2019-09-20 DIAGNOSIS — R10.12 LEFT UPPER QUADRANT PAIN: ICD-10-CM

## 2019-09-20 DIAGNOSIS — R19.7 DIARRHEA, UNSPECIFIED: ICD-10-CM

## 2019-09-20 DIAGNOSIS — R10.13 EPIGASTRIC PAIN: ICD-10-CM

## 2019-09-20 DIAGNOSIS — K30 FUNCTIONAL DYSPEPSIA: ICD-10-CM

## 2019-09-20 DIAGNOSIS — Z86.010 PERSONAL HISTORY OF COLONIC POLYPS: ICD-10-CM

## 2019-09-20 DIAGNOSIS — Z91.89 OTHER SPECIFIED PERSONAL RISK FACTORS, NOT ELSEWHERE CLASSIF: ICD-10-CM

## 2019-09-20 PROCEDURE — 99213 OFFICE O/P EST LOW 20 MIN: CPT | Performed by: INTERNAL MEDICINE

## 2019-09-20 RX ORDER — PANTOPRAZOLE 40 MG/1
TABLET, DELAYED RELEASE ORAL
Qty: 90 | Refills: 4 | Status: ACTIVE

## 2019-11-27 ENCOUNTER — CLINICAL SUPPORT NO REQUIREMENTS (OUTPATIENT)
Dept: CARDIOLOGY | Facility: CLINIC | Age: 74
End: 2019-11-27

## 2019-11-27 DIAGNOSIS — I44.2 COMPLETE HEART BLOCK (HCC): Primary | ICD-10-CM

## 2019-11-27 PROCEDURE — 93296 REM INTERROG EVL PM/IDS: CPT | Performed by: INTERNAL MEDICINE

## 2019-11-27 PROCEDURE — 93294 REM INTERROG EVL PM/LDLS PM: CPT | Performed by: INTERNAL MEDICINE

## 2020-03-06 ENCOUNTER — OFFICE (AMBULATORY)
Dept: URBAN - METROPOLITAN AREA CLINIC 1 | Facility: CLINIC | Age: 75
End: 2020-03-06

## 2020-03-06 VITALS
HEART RATE: 62 BPM | HEIGHT: 61 IN | SYSTOLIC BLOOD PRESSURE: 145 MMHG | WEIGHT: 162 LBS | DIASTOLIC BLOOD PRESSURE: 71 MMHG

## 2020-03-06 DIAGNOSIS — R10.12 LEFT UPPER QUADRANT PAIN: ICD-10-CM

## 2020-03-06 DIAGNOSIS — R13.10 DYSPHAGIA, UNSPECIFIED: ICD-10-CM

## 2020-03-06 DIAGNOSIS — Z91.89 OTHER SPECIFIED PERSONAL RISK FACTORS, NOT ELSEWHERE CLASSIF: ICD-10-CM

## 2020-03-06 DIAGNOSIS — R10.13 EPIGASTRIC PAIN: ICD-10-CM

## 2020-03-06 DIAGNOSIS — R11.2 NAUSEA WITH VOMITING, UNSPECIFIED: ICD-10-CM

## 2020-03-06 DIAGNOSIS — R19.7 DIARRHEA, UNSPECIFIED: ICD-10-CM

## 2020-03-06 DIAGNOSIS — K29.50 UNSPECIFIED CHRONIC GASTRITIS WITHOUT BLEEDING: ICD-10-CM

## 2020-03-06 DIAGNOSIS — K21.9 GASTRO-ESOPHAGEAL REFLUX DISEASE WITHOUT ESOPHAGITIS: ICD-10-CM

## 2020-03-06 DIAGNOSIS — K52.9 NONINFECTIVE GASTROENTERITIS AND COLITIS, UNSPECIFIED: ICD-10-CM

## 2020-03-06 DIAGNOSIS — K30 FUNCTIONAL DYSPEPSIA: ICD-10-CM

## 2020-03-06 DIAGNOSIS — R13.12 DYSPHAGIA, OROPHARYNGEAL PHASE: ICD-10-CM

## 2020-03-06 PROCEDURE — 99213 OFFICE O/P EST LOW 20 MIN: CPT | Performed by: INTERNAL MEDICINE

## 2020-03-09 ENCOUNTER — CLINICAL SUPPORT NO REQUIREMENTS (OUTPATIENT)
Dept: CARDIOLOGY | Facility: CLINIC | Age: 75
End: 2020-03-09

## 2020-03-09 DIAGNOSIS — I44.2 COMPLETE HEART BLOCK (HCC): Primary | ICD-10-CM

## 2020-03-09 PROCEDURE — 93280 PM DEVICE PROGR EVAL DUAL: CPT | Performed by: INTERNAL MEDICINE

## 2020-08-06 RX ORDER — METOPROLOL SUCCINATE 50 MG
TABLET, EXTENDED RELEASE 24 HR ORAL
Qty: 90 TABLET | Refills: 0 | Status: SHIPPED | OUTPATIENT
Start: 2020-08-06 | End: 2020-10-20

## 2020-08-26 ENCOUNTER — OFFICE VISIT (OUTPATIENT)
Dept: CARDIOLOGY | Facility: CLINIC | Age: 75
End: 2020-08-26

## 2020-08-26 VITALS
WEIGHT: 161.2 LBS | HEIGHT: 61 IN | SYSTOLIC BLOOD PRESSURE: 136 MMHG | BODY MASS INDEX: 30.43 KG/M2 | DIASTOLIC BLOOD PRESSURE: 68 MMHG | HEART RATE: 64 BPM

## 2020-08-26 DIAGNOSIS — I44.2 THIRD DEGREE HEART BLOCK (HCC): Primary | ICD-10-CM

## 2020-08-26 DIAGNOSIS — I10 ESSENTIAL HYPERTENSION: ICD-10-CM

## 2020-08-26 PROCEDURE — 99214 OFFICE O/P EST MOD 30 MIN: CPT | Performed by: INTERNAL MEDICINE

## 2020-08-26 PROCEDURE — 93000 ELECTROCARDIOGRAM COMPLETE: CPT | Performed by: INTERNAL MEDICINE

## 2020-08-26 RX ORDER — CYCLOSPORINE 0.5 MG/ML
EMULSION OPHTHALMIC EVERY 12 HOURS
COMMUNITY
Start: 2019-07-25

## 2020-08-26 NOTE — PROGRESS NOTES
Date of Office Visit: 2020    Patient Name: Barbara Segovia  : 1945    Encounter Provider: Duke Castillo MD  Referring Provider: No ref. provider found  Place of Service: [facility] CARDIOLOGY  Patient Care Team:  Ramiro Briones MD as PCP - General (Internal Medicine)      Chief Complaint   Patient presents with   • Third degree heart block     1 year f/u      History of Present Illness    The patient is a 74-year-old white female with a history of complete heart block as well as hypertension.  She returns to the office today for follow-up.  She is feeling well.  She has no complaints of chest pain or shortness of breath.  She denies lightheadedness nor dizziness.    Past Medical History:   Diagnosis Date   • Arthritis     rheumatoid arthritis   • Asthma    • Cancer (CMS/HCC)     basal cell skin cancer   • COPD (chronic obstructive pulmonary disease) (CMS/HCC)    • Disease of thyroid gland    • Elevated cholesterol    • GERD (gastroesophageal reflux disease)    • History of transfusion    • Hypertension    • PONV (postoperative nausea and vomiting)    • Third degree heart block (CMS/HCC)          Past Surgical History:   Procedure Laterality Date   • CARDIAC ELECTROPHYSIOLOGY PROCEDURE N/A 2018    Procedure: Pacemaker DC new;  Surgeon: Duke Castillo MD;  Location: [facility] INVASIVE LOCATION;  Service: Cardiovascular   • EYE SURGERY     • FOOT SURGERY Right     hammer toe   • HAND SURGERY Right    • HIP CLOSED REDUCTION Left 2019    Procedure: HIP CLOSED REDUCTION;  Surgeon: Shant Cook MD;  Location: [facility] OR;  Service: Orthopedics   • HYSTERECTOMY     • JOINT REPLACEMENT     • PACEMAKER IMPLANTATION  2018           Current Outpatient Medications:   •  albuterol (PROVENTIL HFA;VENTOLIN HFA) 108 (90 Base) MCG/ACT inhaler, Inhale 2 puffs Every 4 (Four) Hours As Needed for Wheezing., Disp: , Rfl:   •  aspirin 81  MG EC tablet, Take 81 mg by mouth Every Evening., Disp: , Rfl:   •  Baricitinib (Olumiant) 2 MG tablet, Take 2 mg by mouth Daily., Disp: , Rfl:   •  calcium carbonate (OS-CANDIDO) 600 MG tablet, Take 900 mg by mouth Every Evening., Disp: , Rfl:   •  cycloSPORINE (Restasis) 0.05 % ophthalmic emulsion, Apply  to eye(s) as directed by provider Every 12 (Twelve) Hours., Disp: , Rfl:   •  fluticasone-salmeterol (ADVAIR) 100-50 MCG/DOSE DISKUS, Inhale 2 (Two) Times a Day As Needed., Disp: , Rfl:   •  HYDROcodone-acetaminophen (NORCO) 7.5-325 MG per tablet, Take 1 tablet by mouth 2 (Two) Times a Day As Needed for Moderate Pain ., Disp: , Rfl:   •  hydroxychloroquine (PLAQUENIL) 200 MG tablet, Take 200 mg by mouth Every Evening., Disp: , Rfl:   •  levothyroxine (SYNTHROID, LEVOTHROID) 75 MCG tablet, Take 75 mcg by mouth Daily., Disp: , Rfl:   •  losartan (COZAAR) 50 MG tablet, Take 50 mg by mouth Daily., Disp: , Rfl:   •  pantoprazole (PROTONIX) 40 MG EC tablet, Take 40 mg by mouth Daily., Disp: , Rfl:   •  sertraline (ZOLOFT) 100 MG tablet, Take 100 mg by mouth Daily., Disp: , Rfl:   •  TOPROL XL 50 MG 24 hr tablet, TAKE 1 TABLET DAILY, Disp: 90 tablet, Rfl: 0  •  vitamin B-12 (CYANOCOBALAMIN) 500 MCG tablet, Take 500 mcg by mouth Daily., Disp: , Rfl:   •  vitamin D (ERGOCALCIFEROL) 13481 units capsule capsule, Take 50,000 Units by mouth Every 14 (Fourteen) Days., Disp: , Rfl:       Social History     Socioeconomic History   • Marital status:      Spouse name: Not on file   • Number of children: Not on file   • Years of education: Not on file   • Highest education level: Not on file   Tobacco Use   • Smoking status: Never Smoker   • Smokeless tobacco: Never Used   • Tobacco comment: Patient reported that she does not smoke   Substance and Sexual Activity   • Alcohol use: No     Comment: no caffeine   • Drug use: No   • Sexual activity: Defer         Review of Systems   Constitution: Negative.   HENT: Negative.    Eyes:  "Negative.    Cardiovascular: Negative.    Respiratory: Negative.    Endocrine: Negative.    Skin: Negative.    Musculoskeletal: Negative.    Gastrointestinal: Negative.    Neurological: Negative.    Psychiatric/Behavioral: Negative.        Procedures      ECG 12 Lead  Date/Time: 8/26/2020 11:39 AM  Performed by: Duke Castillo MD  Authorized by: Duke Castillo MD   Comparison: compared with previous ECG from 7/13/2018  Comparison to previous ECG: Pacemaker function is new  Rhythm: sinus rhythm  Pacing: dual chamber pacing, 100% capture and ventricular pacing              Objective:    /68   Pulse 64   Ht 154.9 cm (61\")   Wt 73.1 kg (161 lb 3.2 oz)   BMI 30.46 kg/m²         Physical Exam   Constitutional: She is oriented to person, place, and time. She appears well-developed and well-nourished.   HENT:   Head: Normocephalic.   Eyes: Pupils are equal, round, and reactive to light.   Neck: Normal range of motion. No JVD present. Carotid bruit is not present. No thyromegaly present.   Cardiovascular: Normal rate, regular rhythm, S1 normal, S2 normal, normal heart sounds and intact distal pulses. Exam reveals no gallop and no friction rub.   No murmur heard.  Pulmonary/Chest: Effort normal and breath sounds normal.   Abdominal: Soft. Bowel sounds are normal.   Musculoskeletal: She exhibits no edema.   Neurological: She is alert and oriented to person, place, and time.   Skin: Skin is warm, dry and intact. No erythema.   Psychiatric: She has a normal mood and affect.   Vitals reviewed.          Assessment:       Diagnosis Plan   1. Third degree heart block (CMS/HCC)     2. Essential hypertension     .  Complete heart block: Status post permanent pacemaker implant  2.  Hypertension controlled           Plan:         "

## 2020-10-20 RX ORDER — METOPROLOL SUCCINATE 50 MG
TABLET, EXTENDED RELEASE 24 HR ORAL
Qty: 90 TABLET | Refills: 3 | Status: SHIPPED | OUTPATIENT
Start: 2020-10-20 | End: 2021-08-26 | Stop reason: SDUPTHER

## 2021-06-07 PROCEDURE — 93294 REM INTERROG EVL PM/LDLS PM: CPT | Performed by: INTERNAL MEDICINE

## 2021-06-07 PROCEDURE — 93296 REM INTERROG EVL PM/IDS: CPT | Performed by: INTERNAL MEDICINE

## 2021-08-26 ENCOUNTER — CLINICAL SUPPORT NO REQUIREMENTS (OUTPATIENT)
Dept: CARDIOLOGY | Facility: CLINIC | Age: 76
End: 2021-08-26

## 2021-08-26 ENCOUNTER — OFFICE VISIT (OUTPATIENT)
Dept: CARDIOLOGY | Facility: CLINIC | Age: 76
End: 2021-08-26

## 2021-08-26 VITALS
SYSTOLIC BLOOD PRESSURE: 126 MMHG | BODY MASS INDEX: 29.49 KG/M2 | OXYGEN SATURATION: 97 % | HEART RATE: 67 BPM | WEIGHT: 156.2 LBS | DIASTOLIC BLOOD PRESSURE: 86 MMHG | HEIGHT: 61 IN

## 2021-08-26 DIAGNOSIS — I44.2 COMPLETE HEART BLOCK (HCC): Primary | ICD-10-CM

## 2021-08-26 DIAGNOSIS — I10 ESSENTIAL HYPERTENSION: ICD-10-CM

## 2021-08-26 DIAGNOSIS — I44.2 THIRD DEGREE HEART BLOCK (HCC): Primary | ICD-10-CM

## 2021-08-26 PROCEDURE — 93280 PM DEVICE PROGR EVAL DUAL: CPT | Performed by: INTERNAL MEDICINE

## 2021-08-26 PROCEDURE — 99214 OFFICE O/P EST MOD 30 MIN: CPT | Performed by: INTERNAL MEDICINE

## 2021-08-26 RX ORDER — METOPROLOL SUCCINATE 50 MG/1
50 TABLET, EXTENDED RELEASE ORAL DAILY
Qty: 90 TABLET | Refills: 3 | Status: SHIPPED | OUTPATIENT
Start: 2021-08-26

## 2021-08-26 RX ORDER — EZETIMIBE 10 MG/1
TABLET ORAL
COMMUNITY
Start: 2021-08-19

## 2021-08-26 RX ORDER — TOLTERODINE 2 MG/1
1 CAPSULE, EXTENDED RELEASE ORAL DAILY
COMMUNITY

## 2021-08-26 NOTE — PROGRESS NOTES
OFFICE VISIT      Date of Office Visit: 2021    Patient Name: Barbara Segovia  : 1945    Encounter Provider: Duke Castillo MD  Referring Provider: No ref. provider found  Primary Care Provider: Ramiro Briones MD  Place of Service: [facility] CARDIOLOGY        Chief Complaint   Patient presents with   • Third degree heart block   • Follow-up     History of Present Illness    The patient is a 75-year-old white female with a history of complete heart block.  She is here for pacemaker follow-up.  She also has a history of hypertension and recently diagnosed hyperlipidemia.  She has no symptoms other than chronic right ankle edema and fatigue.  She suffers from rheumatoid arthritis.    Past Medical History:   Diagnosis Date   • Arthritis     rheumatoid arthritis   • Asthma    • Cancer (CMS/HCC)     basal cell skin cancer   • COPD (chronic obstructive pulmonary disease) (CMS/HCC)    • Disease of thyroid gland    • Elevated cholesterol    • GERD (gastroesophageal reflux disease)    • History of transfusion    • Hypertension    • PONV (postoperative nausea and vomiting)    • Third degree heart block (CMS/HCC)          Past Surgical History:   Procedure Laterality Date   • CARDIAC ELECTROPHYSIOLOGY PROCEDURE N/A 2018    Procedure: Pacemaker DC new;  Surgeon: Duke Castillo MD;  Location: [facility] INVASIVE LOCATION;  Service: Cardiovascular   • EYE SURGERY     • FOOT SURGERY Right     hammer toe   • HAND SURGERY Right    • HIP CLOSED REDUCTION Left 2019    Procedure: HIP CLOSED REDUCTION;  Surgeon: Shant Cook MD;  Location: [facility];  Service: Orthopedics   • HYSTERECTOMY     • JOINT REPLACEMENT     • PACEMAKER IMPLANTATION  2018           Current Outpatient Medications:   •  albuterol (PROVENTIL HFA;VENTOLIN HFA) 108 (90 Base) MCG/ACT inhaler, Inhale 2 puffs Every 4 (Four) Hours As Needed for Wheezing., Disp: , Rfl:   •   aspirin 81 MG EC tablet, Take 81 mg by mouth Every Evening., Disp: , Rfl:   •  Baricitinib (Olumiant) 2 MG tablet, Take 2 mg by mouth Daily., Disp: , Rfl:   •  calcium carbonate (OS-CANDIDO) 600 MG tablet, Take 900 mg by mouth Every Evening., Disp: , Rfl:   •  cycloSPORINE (Restasis) 0.05 % ophthalmic emulsion, Apply  to eye(s) as directed by provider Every 12 (Twelve) Hours., Disp: , Rfl:   •  ezetimibe (ZETIA) 10 MG tablet, , Disp: , Rfl:   •  fluticasone-salmeterol (ADVAIR) 100-50 MCG/DOSE DISKUS, Inhale 2 (Two) Times a Day As Needed., Disp: , Rfl:   •  HYDROcodone-acetaminophen (NORCO) 7.5-325 MG per tablet, Take 1 tablet by mouth 2 (Two) Times a Day As Needed for Moderate Pain ., Disp: , Rfl:   •  hydroxychloroquine (PLAQUENIL) 200 MG tablet, Take 200 mg by mouth Every Evening., Disp: , Rfl:   •  levothyroxine (SYNTHROID, LEVOTHROID) 75 MCG tablet, Take 75 mcg by mouth Daily., Disp: , Rfl:   •  losartan (COZAAR) 50 MG tablet, Take 50 mg by mouth Daily., Disp: , Rfl:   •  metoprolol succinate XL (Toprol XL) 50 MG 24 hr tablet, Take 1 tablet by mouth Daily., Disp: 90 tablet, Rfl: 3  •  pantoprazole (PROTONIX) 40 MG EC tablet, Take 40 mg by mouth Daily., Disp: , Rfl:   •  sertraline (ZOLOFT) 100 MG tablet, Take 100 mg by mouth Daily., Disp: , Rfl:   •  tolterodine LA (DETROL LA) 2 MG 24 hr capsule, Take 1 capsule by mouth Daily., Disp: , Rfl:   •  vitamin B-12 (CYANOCOBALAMIN) 500 MCG tablet, Take 500 mcg by mouth Daily., Disp: , Rfl:   •  vitamin D (ERGOCALCIFEROL) 92075 units capsule capsule, Take 50,000 Units by mouth Every 14 (Fourteen) Days., Disp: , Rfl:       Social History     Socioeconomic History   • Marital status:      Spouse name: Not on file   • Number of children: Not on file   • Years of education: Not on file   • Highest education level: Not on file   Tobacco Use   • Smoking status: Never Smoker   • Smokeless tobacco: Never Used   • Tobacco comment: Patient reported that she does not smoke  "  Substance and Sexual Activity   • Alcohol use: No     Comment: no caffeine   • Drug use: No   • Sexual activity: Defer         Review of Systems   Constitutional: Positive for malaise/fatigue.   HENT: Negative.    Eyes: Negative.    Cardiovascular: Positive for leg swelling.   Respiratory: Negative.    Endocrine: Negative.    Skin: Negative.    Musculoskeletal: Negative.    Gastrointestinal: Negative.    Neurological: Negative.    Psychiatric/Behavioral: Negative.        Procedures    Procedures        Objective:    /86 (BP Location: Left arm, Patient Position: Sitting)   Pulse 67   Ht 154.9 cm (61\")   Wt 70.9 kg (156 lb 3.2 oz)   SpO2 97%   BMI 29.51 kg/m²         Vitals reviewed.   Constitutional:       Appearance: Well-developed.   HENT:      Head: Normocephalic.   Neck:      Thyroid: No thyromegaly.      Vascular: No carotid bruit or JVD.   Pulmonary:      Effort: Pulmonary effort is normal.      Breath sounds: Normal breath sounds.   Cardiovascular:      Normal rate. Regular rhythm. Normal S1. Normal S2.      Murmurs: There is no murmur.      No gallop. No click. No rub.   Pulses:     Intact distal pulses.   Edema:     Peripheral edema absent.   Musculoskeletal:      Comments: Marked hand deformity secondary to arthritis Skin:     General: Skin is warm and dry.      Findings: No erythema.   Neurological:      Mental Status: Alert and oriented to person, place, and time.             Assessment:       Diagnosis Plan   1. Third degree heart block (CMS/HCC)     2. Essential hypertension           1.  Complete heart block: Status post permanent pacemaker implant with normal function  2.  Hypertension: Controlled  3.  Hyperlipidemia: Reviewed cholesterol with the patient.  She was started on Zetia with follow-up labs to be done in 1 month.  I believe she will probably need statin therapy based on her levels.  Her LDL was greater than 160.     Plan:         "

## 2021-09-06 PROCEDURE — 93296 REM INTERROG EVL PM/IDS: CPT | Performed by: INTERNAL MEDICINE

## 2021-09-06 PROCEDURE — 93294 REM INTERROG EVL PM/LDLS PM: CPT | Performed by: INTERNAL MEDICINE

## 2021-12-06 PROCEDURE — 93296 REM INTERROG EVL PM/IDS: CPT | Performed by: INTERNAL MEDICINE

## 2021-12-06 PROCEDURE — 93294 REM INTERROG EVL PM/LDLS PM: CPT | Performed by: INTERNAL MEDICINE

## 2022-03-07 PROCEDURE — 93294 REM INTERROG EVL PM/LDLS PM: CPT | Performed by: INTERNAL MEDICINE

## 2022-03-07 PROCEDURE — 93296 REM INTERROG EVL PM/IDS: CPT | Performed by: INTERNAL MEDICINE

## 2023-05-17 ENCOUNTER — OFFICE (AMBULATORY)
Dept: URBAN - METROPOLITAN AREA CLINIC 76 | Facility: CLINIC | Age: 78
End: 2023-05-17

## 2023-05-17 VITALS
HEIGHT: 61 IN | OXYGEN SATURATION: 98 % | HEART RATE: 76 BPM | WEIGHT: 139 LBS | SYSTOLIC BLOOD PRESSURE: 130 MMHG | DIASTOLIC BLOOD PRESSURE: 70 MMHG

## 2023-05-17 DIAGNOSIS — Z86.010 PERSONAL HISTORY OF COLONIC POLYPS: ICD-10-CM

## 2023-05-17 DIAGNOSIS — K52.9 NONINFECTIVE GASTROENTERITIS AND COLITIS, UNSPECIFIED: ICD-10-CM

## 2023-05-17 DIAGNOSIS — R10.30 LOWER ABDOMINAL PAIN, UNSPECIFIED: ICD-10-CM

## 2023-05-17 PROCEDURE — 99204 OFFICE O/P NEW MOD 45 MIN: CPT | Performed by: INTERNAL MEDICINE

## 2023-06-01 PROBLEM — Z86.010 SURVEILLANCE DUE TO PRIOR COLONIC NEOPLASIA: Status: ACTIVE | Noted: 2023-06-02

## 2023-06-06 VITALS
TEMPERATURE: 97.2 F | SYSTOLIC BLOOD PRESSURE: 106 MMHG | SYSTOLIC BLOOD PRESSURE: 88 MMHG | RESPIRATION RATE: 15 BRPM | OXYGEN SATURATION: 94 % | SYSTOLIC BLOOD PRESSURE: 151 MMHG | HEART RATE: 61 BPM | DIASTOLIC BLOOD PRESSURE: 40 MMHG | DIASTOLIC BLOOD PRESSURE: 71 MMHG | RESPIRATION RATE: 10 BRPM | HEART RATE: 65 BPM | DIASTOLIC BLOOD PRESSURE: 34 MMHG | SYSTOLIC BLOOD PRESSURE: 97 MMHG | SYSTOLIC BLOOD PRESSURE: 95 MMHG | SYSTOLIC BLOOD PRESSURE: 165 MMHG | HEART RATE: 62 BPM | RESPIRATION RATE: 19 BRPM | SYSTOLIC BLOOD PRESSURE: 108 MMHG | RESPIRATION RATE: 13 BRPM | HEART RATE: 60 BPM | HEART RATE: 66 BPM | DIASTOLIC BLOOD PRESSURE: 53 MMHG | DIASTOLIC BLOOD PRESSURE: 35 MMHG | RESPIRATION RATE: 21 BRPM | HEART RATE: 57 BPM | WEIGHT: 130 LBS | RESPIRATION RATE: 14 BRPM | DIASTOLIC BLOOD PRESSURE: 54 MMHG | DIASTOLIC BLOOD PRESSURE: 57 MMHG | RESPIRATION RATE: 22 BRPM | SYSTOLIC BLOOD PRESSURE: 100 MMHG | SYSTOLIC BLOOD PRESSURE: 81 MMHG | SYSTOLIC BLOOD PRESSURE: 115 MMHG | OXYGEN SATURATION: 93 % | SYSTOLIC BLOOD PRESSURE: 136 MMHG | SYSTOLIC BLOOD PRESSURE: 82 MMHG | DIASTOLIC BLOOD PRESSURE: 61 MMHG | DIASTOLIC BLOOD PRESSURE: 41 MMHG | RESPIRATION RATE: 11 BRPM | DIASTOLIC BLOOD PRESSURE: 45 MMHG | SYSTOLIC BLOOD PRESSURE: 149 MMHG | DIASTOLIC BLOOD PRESSURE: 49 MMHG | DIASTOLIC BLOOD PRESSURE: 42 MMHG | HEART RATE: 64 BPM | DIASTOLIC BLOOD PRESSURE: 47 MMHG | DIASTOLIC BLOOD PRESSURE: 64 MMHG | SYSTOLIC BLOOD PRESSURE: 117 MMHG | DIASTOLIC BLOOD PRESSURE: 59 MMHG | SYSTOLIC BLOOD PRESSURE: 119 MMHG | DIASTOLIC BLOOD PRESSURE: 48 MMHG | DIASTOLIC BLOOD PRESSURE: 58 MMHG | SYSTOLIC BLOOD PRESSURE: 93 MMHG | HEART RATE: 59 BPM | SYSTOLIC BLOOD PRESSURE: 154 MMHG | RESPIRATION RATE: 25 BRPM | SYSTOLIC BLOOD PRESSURE: 129 MMHG | SYSTOLIC BLOOD PRESSURE: 94 MMHG | HEART RATE: 78 BPM | DIASTOLIC BLOOD PRESSURE: 46 MMHG | RESPIRATION RATE: 20 BRPM | OXYGEN SATURATION: 98 % | SYSTOLIC BLOOD PRESSURE: 96 MMHG | HEART RATE: 70 BPM | OXYGEN SATURATION: 99 % | RESPIRATION RATE: 17 BRPM | HEIGHT: 59 IN | SYSTOLIC BLOOD PRESSURE: 143 MMHG | HEART RATE: 69 BPM | SYSTOLIC BLOOD PRESSURE: 91 MMHG | SYSTOLIC BLOOD PRESSURE: 109 MMHG | DIASTOLIC BLOOD PRESSURE: 55 MMHG | RESPIRATION RATE: 18 BRPM | DIASTOLIC BLOOD PRESSURE: 38 MMHG | HEART RATE: 72 BPM | SYSTOLIC BLOOD PRESSURE: 120 MMHG | HEART RATE: 71 BPM | DIASTOLIC BLOOD PRESSURE: 39 MMHG | SYSTOLIC BLOOD PRESSURE: 105 MMHG | DIASTOLIC BLOOD PRESSURE: 56 MMHG | RESPIRATION RATE: 16 BRPM | OXYGEN SATURATION: 100 %

## 2023-06-12 ENCOUNTER — AMBULATORY SURGICAL CENTER (AMBULATORY)
Dept: URBAN - METROPOLITAN AREA SURGERY 17 | Facility: SURGERY | Age: 78
End: 2023-06-12

## 2023-06-12 ENCOUNTER — OFFICE (AMBULATORY)
Dept: URBAN - METROPOLITAN AREA PATHOLOGY 4 | Facility: PATHOLOGY | Age: 78
End: 2023-06-12

## 2023-06-12 DIAGNOSIS — Z86.010 PERSONAL HISTORY OF COLONIC POLYPS: ICD-10-CM

## 2023-06-12 DIAGNOSIS — C18.7 MALIGNANT NEOPLASM OF SIGMOID COLON: ICD-10-CM

## 2023-06-12 DIAGNOSIS — D12.3 BENIGN NEOPLASM OF TRANSVERSE COLON: ICD-10-CM

## 2023-06-12 DIAGNOSIS — D12.0 BENIGN NEOPLASM OF CECUM: ICD-10-CM

## 2023-06-12 DIAGNOSIS — R19.7 DIARRHEA, UNSPECIFIED: ICD-10-CM

## 2023-06-12 DIAGNOSIS — D12.4 BENIGN NEOPLASM OF DESCENDING COLON: ICD-10-CM

## 2023-06-12 DIAGNOSIS — K52.832 LYMPHOCYTIC COLITIS: ICD-10-CM

## 2023-06-12 PROBLEM — K63.5 POLYP OF COLON: Status: ACTIVE | Noted: 2023-06-02

## 2023-06-12 PROBLEM — D12.5 BENIGN NEOPLASM OF SIGMOID COLON: Status: ACTIVE | Noted: 2023-06-02

## 2023-06-12 LAB
GI HISTOLOGY: A. UNSPECIFIED: (no result)
GI HISTOLOGY: B. SELECT: (no result)
GI HISTOLOGY: C. UNSPECIFIED: (no result)
GI HISTOLOGY: D. SELECT: (no result)
GI HISTOLOGY: E. SELECT: (no result)
GI HISTOLOGY: F. SELECT: (no result)
GI HISTOLOGY: G. UNSPECIFIED: (no result)
GI HISTOLOGY: PDF REPORT: (no result)

## 2023-06-12 PROCEDURE — 45380 COLONOSCOPY AND BIOPSY: CPT | Mod: 51,59 | Performed by: INTERNAL MEDICINE

## 2023-06-12 PROCEDURE — 45380 COLONOSCOPY AND BIOPSY: CPT | Mod: 59,51 | Performed by: INTERNAL MEDICINE

## 2023-06-12 PROCEDURE — 45385 COLONOSCOPY W/LESION REMOVAL: CPT | Performed by: INTERNAL MEDICINE

## 2023-06-12 PROCEDURE — 88342 IMHCHEM/IMCYTCHM 1ST ANTB: CPT | Performed by: INTERNAL MEDICINE

## 2023-06-12 PROCEDURE — 88305 TISSUE EXAM BY PATHOLOGIST: CPT | Performed by: INTERNAL MEDICINE

## 2023-06-12 PROCEDURE — 45390 COLONOSCOPY W/RESECTION: CPT | Mod: 59 | Performed by: INTERNAL MEDICINE

## 2023-06-12 PROCEDURE — 88341 IMHCHEM/IMCYTCHM EA ADD ANTB: CPT | Performed by: INTERNAL MEDICINE

## 2023-06-12 NOTE — SERVICEHPINOTES
The patient presents for diarrhea. She has a hx of reflux on pantoprazole. Has hx "choking" adn a speech evaluation showing some risk of aspiration. She has a hx of diarrhea in the past, related to diet it seems. Had been on MTX in the past and once stopped that, diarrhea got better. She reports that in april, when on vacation, would develop severe abdominal cramping and followed by diarrhea with incontinence. However, 1-2 weeks ago, seems to be worse after eating, no particular food trigger. A day or two after diarrhea, then would be constipated. That usually follows the imodium but not necessarily. She is not taking NSAIDs. When she takes hydrocodone, it does not seem to constipate her. Tried: imodium (helps)Also has hx colon polyps. Data reviewed:[id number]  6/14: hemorrhoids, ow nl CN

## 2023-06-13 PROBLEM — D12.5 BENIGN NEOPLASM OF SIGMOID COLON: Status: ACTIVE | Noted: 2023-06-12

## 2023-06-13 PROBLEM — K63.5 POLYP OF COLON: Status: ACTIVE | Noted: 2023-06-12

## (undated) DEVICE — PILLW ABD SM

## (undated) DEVICE — ELECTRD ECG CARBON/SNP RL FM A/ 5PK

## (undated) DEVICE — Device

## (undated) DEVICE — PENCL E/S HNDSWCH ROCKR CB

## (undated) DEVICE — LOU PACE DEFIB: Brand: MEDLINE INDUSTRIES, INC.